# Patient Record
Sex: MALE | Race: WHITE | URBAN - METROPOLITAN AREA
[De-identification: names, ages, dates, MRNs, and addresses within clinical notes are randomized per-mention and may not be internally consistent; named-entity substitution may affect disease eponyms.]

---

## 2023-04-06 ENCOUNTER — APPOINTMENT (OUTPATIENT)
Dept: RADIOLOGY | Facility: OTHER | Age: 23
End: 2023-04-06

## 2023-04-06 VITALS
SYSTOLIC BLOOD PRESSURE: 127 MMHG | HEART RATE: 72 BPM | WEIGHT: 196 LBS | HEIGHT: 71 IN | DIASTOLIC BLOOD PRESSURE: 81 MMHG | BODY MASS INDEX: 27.44 KG/M2

## 2023-04-06 DIAGNOSIS — M25.511 ACUTE PAIN OF RIGHT SHOULDER: ICD-10-CM

## 2023-04-06 DIAGNOSIS — M24.811 INTERNAL DERANGEMENT OF RIGHT SHOULDER: Primary | ICD-10-CM

## 2023-04-06 NOTE — PROGRESS NOTES
"  Assessment  Diagnoses and all orders for this visit:    Internal derangement of right shoulder    Discussion and Plan:    · Explained to the patient that his examination and mechanism of injury is worrisome for a re current tear of his labrum  He had previous labral surgery when he was in high school due to a dislocation injury  · We will order an MRI arthrogram of his right shoulder to further evaluate the labrum  · In parallel he will begin formal physical therapy/HEP  · Follow up after MRI arthrogram for discussion of results and further treatment options based on these results  Subjective:   Patient ID: Martha Quevedo is a 20 y o  male    The patient presents with a chief complaint of right shoulder pain  The pain began 2 week(s) ago and is associated with an acute injury  Patient reports that he was bench pressing on 3/27/23 and felt a \"pop\" about the shoulder with associated pain  The patient describes the pain as aching and sharp in intensity,  intermittent in timing, and localizes the pain to the  right glenohumeral joint, deltoid  The pain is worse with overhead work, overuse and raising arm over head and relieved by rest, ice, avoiding the painful activities  The pain is not associated with numbness and tingling  The pain is not associated with constitutional symptoms  The patient is awoken at night by the pain  The patient has had prior treatment in the form of a previous arthroscopic labral repair when he was in high school in [place]          The following portions of the patient's history were reviewed and updated as appropriate: allergies, current medications, past family history, past medical history, past social history, past surgical history and problem list     Objective:  /81 (BP Location: Left arm, Patient Position: Sitting, Cuff Size: Adult)   Pulse 72   Ht 5' 11\" (1 803 m) Comment: verbal  Wt 88 9 kg (196 lb)   BMI 27 34 kg/m²       Right Shoulder Exam     Range " of Motion   The patient has normal right shoulder ROM  Muscle Strength   Abduction: 5/5   External rotation: 5/5     Tests   Apprehension: positive    Other   Erythema: absent  Sensation: normal  Pulse: present    Comments:  (+) [place]'s Test            Physical Exam  Constitutional:       General: He is not in acute distress  Appearance: He is well-developed  Eyes:      Conjunctiva/sclera: Conjunctivae normal       Pupils: Pupils are equal, round, and reactive to light  Cardiovascular:      Rate and Rhythm: Normal rate and regular rhythm  Pulmonary:      Effort: Pulmonary effort is normal       Breath sounds: Normal breath sounds  Abdominal:      General: Bowel sounds are normal       Palpations: Abdomen is soft  Musculoskeletal:      Cervical back: Normal range of motion and neck supple  Skin:     General: Skin is warm and dry  Findings: No erythema or rash  Neurological:      Mental Status: He is alert and oriented to person, place, and time  Deep Tendon Reflexes: Reflexes are normal and symmetric  Psychiatric:         Behavior: Behavior normal        I have personally reviewed pertinent films in PACS and my interpretation is as follows  X Ray Right Shoulder 4/6/23: No acute osseous abnormalities or degenerative changes       Scribe Attestation    I,:  Pennie Mercado am acting as a scribe while in the presence of the attending physician :       I,:  Noelle Navarrete MD personally performed the services described in this documentation    as scribed in my presence :

## 2023-04-25 ENCOUNTER — OFFICE VISIT (OUTPATIENT)
Dept: PHYSICAL THERAPY | Facility: OTHER | Age: 23
End: 2023-04-25

## 2023-04-25 DIAGNOSIS — M25.811 IMPINGEMENT OF RIGHT SHOULDER: Primary | ICD-10-CM

## 2023-04-25 DIAGNOSIS — R29.3 POSTURE IMBALANCE: ICD-10-CM

## 2023-04-25 NOTE — PROGRESS NOTES
"Daily Note     Today's date: 2023  Patient name: Tim Do  : 2000  MRN: [id number]  Referring provider: Barbara Medina*  Dx:   Encounter Diagnosis     ICD-10-CM    1  Impingement of right shoulder  M25 811       2  Posture imbalance  R29 3         Total treatment time: 22 [id number]; one on one 6124-5586             Subjective: Pt remarks feeling worse since previous visit and wants to make sure his home exercises are being done right  Objective: See treatment diary below    Cervical PROM:   L and R SB 50% limited     ULTT:  L: Radial (-) Median (+) Ulnar (+)   R: Radial (+) Median (-) Ulnar (-)      Assessment: Pt demonstrated HEP  Assessed cervical PROM which revealed soft tissue tightness and decreased mobility in B SB  Tested B ULTT and revealed B UE neural tension  Delivered central glides to C7 with R radial nerve glides that relieved tightness and increased cervical PROM upon retest  Added radial nerve glides for HEP  Introduced SA punches, OTB LPD/standing rows  Pt required vc on postural control and tolerated appropriately demonstrating moderate fatigue  Pt will continue to participate in skilled PT in order to improve postural control and improve cervical mobility in order to reduce symptoms and return to PLOF  Plan: Continue per plan of care        Precautions: N/A      Manuals            R Shoulder PROM AC            R GHJ A-P/Inf Gl[place] AC  Gr III            Thoracic P-A  nv           B ULTT  AC             Central Glide C7 with radial nerve glides   AC           Neuro Re-Ed             Standing Shoulder Scaption 10x2  W/ active shoulder depression            Prone Scapular Retractions 10x2x5\"            Supine pec Stretch on Foam Roll 30\"x3            SA punches  10x2x3\"           SA wall slides             Standing Rows   10x2           Prone Ts             Thoracic Extension             Prone Rows             LPD  OTB  10x2           Prone Is  10x2x5\"     " Ther Ex             pulley  5'           UBE arm bike                                                                                           Ther Activity             Pt Education Impingement syndrome  Healing expectations Referred pain (neck and shoulder )                        Gait Training                                       Modalities

## 2023-04-27 ENCOUNTER — APPOINTMENT (OUTPATIENT)
Dept: PHYSICAL THERAPY | Facility: OTHER | Age: 23
End: 2023-04-27

## 2023-05-01 ENCOUNTER — OFFICE VISIT (OUTPATIENT)
Dept: PHYSICAL THERAPY | Facility: OTHER | Age: 23
End: 2023-05-01

## 2023-05-01 DIAGNOSIS — M25.811 IMPINGEMENT OF RIGHT SHOULDER: Primary | ICD-10-CM

## 2023-05-01 DIAGNOSIS — R29.3 POSTURE IMBALANCE: ICD-10-CM

## 2023-05-01 NOTE — PROGRESS NOTES
"Daily Note     Today's date: 2023  Patient name: Patti Felipe  : 2000  MRN: [id number]  Referring provider: Becky Bentley*  Dx:   Encounter Diagnosis     ICD-10-CM    1  Impingement of right shoulder  M25 811       2  Posture imbalance  R29 3                      Subjective: \"My symptoms have definitely reduced  My neck feels a little tight today  \"      Objective: See treatment diary below      Assessment: Applied cervical glides with median nerve/radial nerve tension that improve cervical and neck mobility post delivery  Delivered thoracic P-A and GrV of T2-T3 in order to promote thoracic mobility  Introduced SA push ups and SA ball on wall  Pt tolerated with mild difficulty requiring vc on B scapular depression throughout exercises  Pt will continue to decrease UE neural tension and improve scapular strength to contribute to postural control  Added median nerve glides to HEP  Plan: Continue per plan of care        Precautions: N/A      Manuals           R Shoulder PROM AC            R GHJ A-P/Inf [place] AC  Gr III                         Thoracic P-A  nv AC  Gr III-IV    T2-T3  GrV          B ULTT  AC             Central [place]ide C7 with median/radial nerve glides   AC AC          Neuro Re-Ed             Standing Shoulder Scaption 10x2  W/ active shoulder depression            Prone Scapular Retractions 10x2x5\"            Supine pec Stretch on Foam Roll 30\"x3            SA punches  10x2x3\" 10x2x5\"          SA ball rolls     30x CW/CCW  Red           Wall push ups plus   2x8          Standing Rows   OTB  10x2 BTB  10x2          Prone Ts             Thoracic Extension             Prone Rows             LPD  OTB  10x2           Prone Is  10x2x5\"           Ther Ex             pulley  5'           UBE arm bike   5'                                                                                        Ther Activity             Pt Education Impingement syndrome  Healing " expectations Referred pain (neck and shoulder ) Posture in seated/standing                       Gait Training                                       Modalities

## 2023-05-02 ENCOUNTER — OFFICE VISIT (OUTPATIENT)
Dept: PHYSICAL THERAPY | Facility: OTHER | Age: 23
End: 2023-05-02

## 2023-05-02 DIAGNOSIS — M25.811 IMPINGEMENT OF RIGHT SHOULDER: Primary | ICD-10-CM

## 2023-05-02 DIAGNOSIS — R29.3 POSTURE IMBALANCE: ICD-10-CM

## 2023-05-02 NOTE — PROGRESS NOTES
"Daily Note     Today's date: 2023  Patient name: Princess Vázquez  : 2000  MRN: [id number]  Referring provider: Evelin De Leon*  Dx:   Encounter Diagnosis     ICD-10-CM    1  Impingement of right shoulder  M25 811       2  Posture imbalance  R29 3               Total treatment time: 5326-0094; one on one 9388-1276       Subjective: \"My neck feels tight from work but no soreness  \"       Objective: See treatment diary below      Assessment: Delivered central nerve glides that reduced neck tightness  Progressed pt as tolerated by introducing BOSU push up plus, KB rot, incline bench press and IYT on pball  Pt tolerated demonstrating moderate fatigue, no vc/mc and no symptom reproduction  Nv will incorporate UE throwing activities to mimic return to PLOF per patient  Plan: Continue per plan of care        Precautions: N/A      Manuals  5/         R Shoulder PROM AC            R GHJ A-P/Inf Glide AC  Gr III                         Thoracic P-A  nv AC  Gr III-IV    T2-T3  GrV          B ULTT  AC             Prone CTJ GrV    AC B         Central Glide C7 with median/radial nerve glides   AC AC AC         Neuro Re-Ed             Standing Shoulder Scaption 10x2  W/ active shoulder depression            Prone Scapular Retractions 10x2x5\"            Supine pec Stretch on Foam Roll 30\"x3            SA punches  10x2x3\" 10x2x5\"          BOSU push ups plus    3x5         SA ball rolls     30x CW/CCW  Red           Wall push ups plus   2x8          Standing Rows   OTB  10x2 BTB  10x2          Prone Ts             Thoracic Extension             Prone Rows             LPD  OTB  10x2           On pball  I Y T    10x2x5 ea         Incline Chest press    15# 10x  30#  6x2         KB rot    7 5# 3x10         Prone Is  10x2x5\"           Ther Ex             pulley  5'           UBE arm bike   5' 5'                                                                                       Ther Activity     " Pt Education Impingement syndrome  Healing expectations Referred pain (neck and shoulder ) Posture in seated/standing                       Gait Training                                       Modalities

## 2023-05-08 ENCOUNTER — OFFICE VISIT (OUTPATIENT)
Dept: PHYSICAL THERAPY | Facility: OTHER | Age: 23
End: 2023-05-08

## 2023-05-08 DIAGNOSIS — M25.811 IMPINGEMENT OF RIGHT SHOULDER: Primary | ICD-10-CM

## 2023-05-08 DIAGNOSIS — R29.3 POSTURE IMBALANCE: ICD-10-CM

## 2023-05-08 NOTE — PROGRESS NOTES
"Daily Note     Today's date: 2023  Patient name: Michael Cardenas  : 2000  MRN: [id number]  Referring provider: Chance Rubio*  Dx:   Encounter Diagnosis     ICD-10-CM    1  Impingement of right shoulder  M25 811       2  Posture imbalance  R29 3                     total treatment time 3575-6650, 1-on- 0283-4513  Subjective: \"My shoulder feels good, I was moving a lot today and there are some twinges if I turn my shoulder weird but overall it has been feeling much better  \"      Objective: See treatment diary below      Assessment: Session continued to focus on scapular activation, rhythmic stab, and WB exercises  Tolerated well with moderate fatigue  Educated on returning back to lifting at the gym with proper form and possibly going after PT when he said he feels the most warm  Will continue to progress with higher level UE strengthening, WB, and rhythmic stabilization as tolerated  Plan: Continue per plan of care        Precautions: N/A      Manuals  5 5/ 5        R Shoulder PROM AC            R GHJ A-P/Inf Glide AC  Gr III                         Thoracic P-A  nv AC  Gr III-IV    T2-T3  GrV  KA grIII-IV T1-T12        B ULTT  AC             Prone CTJ GrV    AC B KA B        Central Glide C7 with median/radial nerve glides   AC AC AC nv        Neuro Re-Ed             Standing Shoulder Scaption 10x2  W/ active shoulder depression            Prone Scapular Retractions 10x2x5\"            Supine pec Stretch on Foam Roll 30\"x3            SA punches  10x2x3\" 10x2x5\"          BOSU push ups plus    3x5 3x5        SA ball rolls     30x CW/CCW  Red   30x CW/CCW, ABCx1  Red         Wall push ups plus   2x8          Standing Rows   OTB  10x2 BTB  10x2  nv        Prone Ts             Thoracic Extension             Prone Rows             LPD  OTB  10x2           On pball  I Y T    10x2x5 ea 10x2x5\" ea        Incline Chest press    15# 10x  30#  6x2         KB rot    7 5# 3x10 7 5# " "2x10        Serratus pushups     nv        Prone Is  10x2x5\"                        Plank wobble board     20x AP/ML        Ther Ex             pulley  5'           UBE arm bike   5' 5' 8' retro standing         Side plank      20\"x4 B        Walking plank circuit     nv        Plank tap     nv                                               Ther Activity             Pt Education Impingement syndrome  Healing expectations Referred pain (neck and shoulder ) Posture in seated/standing                       Gait Training                                       Modalities                                            "

## 2023-05-10 ENCOUNTER — OFFICE VISIT (OUTPATIENT)
Dept: PHYSICAL THERAPY | Facility: OTHER | Age: 23
End: 2023-05-10

## 2023-05-10 DIAGNOSIS — M25.811 IMPINGEMENT OF RIGHT SHOULDER: Primary | ICD-10-CM

## 2023-05-10 DIAGNOSIS — R29.3 POSTURE IMBALANCE: ICD-10-CM

## 2023-05-10 NOTE — PROGRESS NOTES
"Daily Note     Today's date: 5/10/2023  Patient name: Ministerio Martinez  : 2000  MRN: [id number]  Referring provider: Roxane Villeda*  Dx:   Encounter Diagnosis     ICD-10-CM    1  Impingement of right shoulder  M25 811       2  Posture imbalance  R29 3                      Subjective: \"I worked out at [employer], it felt pretty good while I was doing it but it hurt a little bit later  I was pushing it and seeing what I could do  \"      Objective: See treatment diary below      Assessment: Continued to progress scapular strengthening, WB, and rhythmic stab, tolerated well, pt reported more symptoms in L shoulder than R  Can add in light overhead lifting, TRX rows, and continue with WB and rhythmic stab nv as tolerated  Plan: Continue per plan of care        Precautions: N/A      Manuals  5/ 5/10       R Shoulder PROM AC            R GHJ A-P/Inf Glide AC  Gr III                         Thoracic P-A  nv AC  Gr III-IV    T2-T3  GrV  KA grIII-IV T1-T12        B ULTT  AC             Prone CTJ GrV    AC B KA B        Central Glide C7 with median/radial nerve glides   AC AC AC nv        B pec and UT STM      KA        Neuro Re-Ed             Standing Shoulder Scaption 10x2  W/ active shoulder depression            Prone Scapular Retractions 10x2x5\"            Supine pec Stretch on Foam Roll 30\"x3     1'       SA punches  10x2x3\" 10x2x5\"          BOSU push ups plus    3x5 3x5        SA ball rolls     30x CW/CCW  Red   30x CW/CCW, ABCx1  Red         Wall push ups plus   2x8          Standing Rows   OTB  10x2 BTB  10x2  nv 30# 1x10  40# 2x10       Prone Ts             Thoracic Extension             Prone Rows             LPD  OTB  10x2           On pball  I Y T    10x2x5 ea 10x2x5\" ea        Incline Chest press    15# 10x  30#  6x2         KB rot    7 5# 3x10 7 5# 2x10        Serratus pushups     nv 3x12       Prone Is  10x2x5\"                        Plank wobble board     20x AP/ML nv    " "   Body blade      20\"x3 elbow straight, shoulder 90 degrees flexion ML/AP       Ther Ex             pulley  5'           UBE arm bike   5' 5' 8' retro standing  8' retro standing        Side plank      20\"x4 B        Walking plank circuit     nv 6\" inch box, floor, BOSU 3x4       Plank tap     nv        1/2 bankrobbers       GTB 2x10 B                                 Ther Activity             Pt Education Impingement syndrome  Healing expectations Referred pain (neck and shoulder ) Posture in seated/standing                       Gait Training                                       Modalities                                            "

## 2023-05-12 NOTE — TELEPHONE ENCOUNTER
I tried to call patient but phone number listed is not going through his mri with [facility] is not approved due to patient having to go to freestanding site per [place] guidelines  Case is currently pending per Thelda Manner with [facility]ology and [facility] but cannot get a hold of patient to let him know

## 2023-05-15 ENCOUNTER — OFFICE VISIT (OUTPATIENT)
Dept: PHYSICAL THERAPY | Facility: OTHER | Age: 23
End: 2023-05-15

## 2023-05-15 DIAGNOSIS — R29.3 POSTURE IMBALANCE: ICD-10-CM

## 2023-05-15 DIAGNOSIS — M25.811 IMPINGEMENT OF RIGHT SHOULDER: Primary | ICD-10-CM

## 2023-05-15 NOTE — PROGRESS NOTES
"Daily Note     Today's date: 5/15/2023  Patient name: Pro Browning  : 2000  MRN: [id number]  Referring provider: Ky Gaston  Dx:   Encounter Diagnosis     ICD-10-CM    1  Impingement of right shoulder  M25 811       2  Posture imbalance  R29 3                      Subjective: Patient reports b/l UT tightness today and \"a little\" painful with certain motions  Objective: See treatment diary below      Assessment: Continued to progress endurance and stabilization as charted  Superset multiple movements to challenge patient  Added light OH press, TRX rows with appropriate fatigue and minimal pain  Plan: Continue per plan of care          Precautions: N/A      Manuals / 5 5/10 5/15      R Shoulder PROM          R GHJ A-P/Inf Glide                    Thoracic P-A  KA grIII-IV T1-T12        B ULTT          Prone CTJ GrV AC B KA B        Central Glide C7 with median/radial nerve glides  AC nv        B pec and UT STM   KA        Neuro Re-Ed          Standing Shoulder Scaption          Prone Scapular Retractions          Supine pec Stretch on Foam Roll   1' Foam roll vinnie 1' ea      SA punches          BOSU push ups plus 3x5 3x5        SA ball rolls    30x CW/CCW, ABCx1  Red         Wall push ups plus          Standing Rows   nv 30# 1x10  40# 2x10       Prone Ts          Thoracic Extension          TRX Rows    3 x 10      LPD          On pball  I Y T 10x2x5 ea 10x2x5\" ea        Incline Chest press 15# 10x  30#  6x2         KB rot 7 5# 3x10 7 5# 2x10  7 5# 2 x 10    Superset arnold press 2 x 10      Serratus pushups  nv 3x12       Prone Is          Wall slides with serratus press    5\" 2 x 10 GTB      Plank wobble board  20x AP/ML nv       Body blade   20\"x3 elbow straight, shoulder 90 degrees flexion ML/AP       Ther Ex          pulley          UBE arm bike 5' 8' retro standing  8' retro standing  2'/2'      Side plank   20\"x4 B        Walking plank circuit  nv 6\" inch box, floor, " BOSU 3x4       Plank tap  nv  3 x 10      1/2 bankrobbers    GTB 2x10 B                           Ther Activity          Pt Education          Bear Crawl     15lb KB 3 laps      Gait Training                              Modalities

## 2023-05-16 ENCOUNTER — HOSPITAL ENCOUNTER (OUTPATIENT)
Dept: RADIOLOGY | Facility: HOSPITAL | Age: 23
Discharge: HOME | End: 2023-05-16
Attending: ORTHOPAEDIC SURGERY

## 2023-05-17 NOTE — TELEPHONE ENCOUNTER
I have tried to get in contact with patient to inform him that his insurance will not approve [facility] facility for mri and he needs to go to a freestanding mri location due to his insurance  I will try again to get a hold of him

## 2023-05-18 ENCOUNTER — OFFICE VISIT (OUTPATIENT)
Dept: PHYSICAL THERAPY | Facility: OTHER | Age: 23
End: 2023-05-18

## 2023-05-18 DIAGNOSIS — R29.3 POSTURE IMBALANCE: ICD-10-CM

## 2023-05-18 DIAGNOSIS — M25.811 IMPINGEMENT OF RIGHT SHOULDER: Primary | ICD-10-CM

## 2023-05-18 NOTE — PROGRESS NOTES
"Daily Note     Today's date: 2023  Patient name: Joe Curiel  : 2000  MRN: [id number]  Referring provider: James Gibson*  Dx:   Encounter Diagnosis     ICD-10-CM    1  Impingement of right shoulder  M25 811       2  Posture imbalance  R29 3                      Subjective: Patient reports he is sore from PT and gym sessions this week  Complaining of mild neck soreness which feels muscular in nature   Objective: See treatment diary below      Assessment: Continued to progress endurance and stabilization as charted  Secondary to soreness in shoulder and neck, did not progress patient today  Added IASTM to R UT and paraspinals  Had PT [facility] assess cervical restrictions which he performed various mobilizations for  Decreased pain and tension post treatment session  Plan: Continue per plan of care          Precautions: N/A      Manuals 5/2 5/8 5/10 5/15 5/18     R Shoulder PROM          R GHJ A-P/Inf [place]          CTJ     GRC     Thoracic P-A  KA grIII-IV T1-T12   GRC     SOR with rotation to the right     [facility]     Multifidus isometric                    Prone CTJ  AC B KA B   GRC     Central [place] C7 with median/radial nerve glides  AC nv        B pec and UT STM   KA        Neuro Re-Ed          Standing Shoulder Scaption          Prone Scapular Retractions          Supine pec Stretch on Foam Roll   1' Foam roll vinnie 1' ea Foam roll vinnie 1' ea     SA punches          BOSU push ups plus 3x5 3x5        SA ball rolls    30x CW/CCW, ABCx1  Red         Wall push ups plus          Standing Rows   nv 30# 1x10  40# 2x10       Prone Ts          Thoracic Extension          TRX Rows    3 x 10 3x10     LPD          On pball  I Y T 10x2x5 ea 10x2x5\" ea        Incline Chest press 15# 10x  30#  6x2         KB rot 7 5# 3x10 7 5# 2x10  7 5# 2 x 10    Superset arnold press 2 x 10 7 5# 2 x 10    Superset arnold press 2 x 10     Serratus pushups  nv 3x12       Prone Is          Wall slides with " "serratus press    5\" 2 x 10 GTB 5\" 2x10 GTB     Plank wobble board  20x AP/ML nv       Body blade   20\"x3 elbow straight, shoulder 90 degrees flexion ML/AP       Ther Ex          pulley          UBE arm bike 5' 8' retro standing  8' retro standing  2'/2' 2'/2'     Side plank   20\"x4 B        Walking plank circuit  nv 6\" inch box, floor, BOSU 3x4       Plank tap  nv  3 x 10 3x10     1/2 bankrobbers    GTB 2x10 B                           Ther Activity          Pt Education          Bear Crawl     15lb KB 3 laps 15lb KB 3 laps     Gait Training                              Modalities                                   "

## 2023-05-23 ENCOUNTER — APPOINTMENT (OUTPATIENT)
Dept: PHYSICAL THERAPY | Facility: OTHER | Age: 23
End: 2023-05-23
Payer: COMMERCIAL

## 2023-05-25 ENCOUNTER — APPOINTMENT (OUTPATIENT)
Dept: PHYSICAL THERAPY | Facility: OTHER | Age: 23
End: 2023-05-25
Payer: COMMERCIAL

## 2023-06-01 ENCOUNTER — APPOINTMENT (OUTPATIENT)
Dept: PHYSICAL THERAPY | Facility: OTHER | Age: 23
End: 2023-06-01
Payer: COMMERCIAL

## 2023-06-06 ENCOUNTER — OFFICE VISIT (OUTPATIENT)
Dept: PHYSICAL THERAPY | Facility: OTHER | Age: 23
End: 2023-06-06
Payer: COMMERCIAL

## 2023-06-06 DIAGNOSIS — M25.811 IMPINGEMENT OF RIGHT SHOULDER: Primary | ICD-10-CM

## 2023-06-06 DIAGNOSIS — R29.3 POSTURE IMBALANCE: ICD-10-CM

## 2023-06-06 PROCEDURE — 97112 NEUROMUSCULAR REEDUCATION: CPT

## 2023-06-06 PROCEDURE — 97110 THERAPEUTIC EXERCISES: CPT

## 2023-06-06 PROCEDURE — 97140 MANUAL THERAPY 1/> REGIONS: CPT

## 2023-06-06 NOTE — PROGRESS NOTES
"Daily Note     Today's date: 2023  Patient name: Davi Hernandez  : 2000  MRN: [id number]  Referring provider: Nel Graves*  Dx:   Encounter Diagnosis     ICD-10-CM    1  Impingement of right shoulder  M25 811       2  Posture imbalance  R29 3                      Subjective: \"My shoulder is good and bad, sometimes it hurts but sometimes it feels really good  It is kind of sporadic  \"      Objective: See treatment diary below      Assessment: Session focused on cervical and periscapular STM as well as cervical and thoracic mobility  Noted cervical restriction in R rot, will continue to address nv  Tolerated well, will resume strengthening nv with WB, rhythmic stab, and OH strengthening as tolerated  Plan: Continue per plan of care        Precautions: N/A      Manuals 5/2 5/8 5/10 5/15 5/18 6    R Shoulder PROM          R GHJ A-P/Inf [place]          CTJ     GRC     Thoracic P-A  KA grIII-IV T1-T12   GRC KA grIII-IV T1-T12    SOR with rotation to the right     [facility]     Multifidus isometric                    Prone CTJ  AC B KA B   GRC     Central [place] C7 with median/radial nerve glides  AC nv        B pec and UT STM   KA    KA    Cervical and periscapular STM      KA    Cervical side glides       KA general grIII-IV    Neuro Re-Ed          Standing Shoulder Scaption          Prone Scapular Retractions          Supine pec Stretch on Foam Roll   1' Foam roll vinnie 1' ea Foam roll vinnie 1' ea Foam roll protocol 1' ea     SA punches          BOSU push ups plus 3x5 3x5        SA ball rolls    30x CW/CCW, ABCx1  Red         Wall push ups plus          Standing Rows   nv 30# 1x10  40# 2x10       Prone Ts          Thoracic Extension          TRX Rows    3 x 10 3x10     LPD          On pball  I Y T 10x2x5 ea 10x2x5\" ea        Incline Chest press 15# 10x  30#  6x2         KB rot 7 5# 3x10 7 5# 2x10  7 5# 2 x 10    Superset arnold press 2 x 10 7 5# 2 x 10    Superset arnold press 2 x 10   " "  Serratus pushups  nv 3x12       Prone Is          Wall slides with serratus press    5\" 2 x 10 GTB 5\" 2x10 GTB     Plank wobble board  20x AP/ML nv       Body blade   20\"x3 elbow straight, shoulder 90 degrees flexion ML/AP       Cervical SNAG      10\"x10 R     Chin retractions supine      5\"x20    Chin tuck and lift 5\"x10x2    Ther Ex          pulley          UBE arm bike 5' 8' retro standing  8' retro standing  2'/2' 2'/2' 6' retro standing    Side plank   20\"x4 B        Walking plank circuit  nv 6\" inch box, floor, BOSU 3x4       Plank tap  nv  3 x 10 3x10     1/2 bankrobbers    GTB 2x10 B                           Ther Activity          Pt Education          Bear Crawl     15lb KB 3 laps 15lb KB 3 laps     Gait Training                              Modalities                                   "

## 2023-06-13 ENCOUNTER — OFFICE VISIT (OUTPATIENT)
Dept: PHYSICAL THERAPY | Facility: OTHER | Age: 23
End: 2023-06-13
Payer: COMMERCIAL

## 2023-06-13 DIAGNOSIS — M25.811 IMPINGEMENT OF RIGHT SHOULDER: Primary | ICD-10-CM

## 2023-06-13 DIAGNOSIS — R29.3 POSTURE IMBALANCE: ICD-10-CM

## 2023-06-13 PROCEDURE — 97110 THERAPEUTIC EXERCISES: CPT

## 2023-06-13 PROCEDURE — 97112 NEUROMUSCULAR REEDUCATION: CPT

## 2023-06-13 PROCEDURE — 97140 MANUAL THERAPY 1/> REGIONS: CPT

## 2023-06-13 NOTE — PROGRESS NOTES
"Daily Note     Today's date: 2023  Patient name: Alma Rosa Reynoso  : 2000  MRN: [id number]  Referring provider: Anabel Ceron*  Dx:   Encounter Diagnosis     ICD-10-CM    1  Impingement of right shoulder  M25 811       2  Posture imbalance  R29 3                    Total treatment time 2635-0088, 1-on-1 8119-5704  Subjective: \"My neck felt much better after last time but I feel like every morning my neck hangs a bit forward  \"      Objective: See treatment diary below      Assessment: Session focused on cervical and periscapular STM as well as cervical and thoracic mobility  Will continue to address cervical and thoracic restrictions nv and incorporate scapular and OH strengthening as tolerated  Plan: Continue per plan of care        Precautions: N/A      Manuals 5/2 5/8 5/10 5/15 5/18 6/6 6/13   R Shoulder PROM          R GHJ A-P/Inf [place]          CTJ     GRC     Thoracic P-A  KA grIII-IV T1-T12   GRC KA grIII-IV T1-T12 KA grIII-IV T1-T12   Lumbar roll grV       [facility]   SOR with rotation to the right     [place]     Multifidus isometric                    Prone CTJ  AC B KA B   GRC     Central [place] C7 with median/radial nerve glides  AC nv        B pec and UT STM   KA    KA    Cervical and periscapular STM      KA KA   Cervical side glides       KA general grIII-IV KA general grIII-IV   Neuro Re-Ed          Standing Shoulder Scaption          Prone Scapular Retractions          Supine pec Stretch on Foam Roll   1' Foam roll vinnie 1' ea Foam roll vinnie 1' ea Foam roll protocol 1' ea  Foam roll protocol 1' ea    SA punches          BOSU push ups plus 3x5 3x5        SA ball rolls    30x CW/CCW, ABCx1  Red         Wall push ups plus          Standing Rows   nv 30# 1x10  40# 2x10       Prone Ts          Thoracic Extension          TRX Rows    3 x 10 3x10  nv   LPD          On pball  I Y T 10x2x5 ea 10x2x5\" ea        Incline Chest press 15# 10x  30#  6x2         KB rot 7 5# 3x10 7 5# 2x10 " " 7 5# 2 x 10    Superset arnold press 2 x 10 7 5# 2 x 10    Superset arnold press 2 x 10     [place] carry       [employer]  nv 3x12       Prone Is          Wall slides with serratus press    5\" 2 x 10 GTB 5\" 2x10 GTB     Plank wobble board  20x AP/ML nv       Body blade   20\"x3 elbow straight, shoulder 90 degrees flexion ML/AP       Cervical SNAG      10\"x10 R     Chin retractions supine      5\"x20    Chin tuck and lift 5\"x10x2 Chin tuck and lift 5\"x10x2   Robots        2x10   Ther Ex          pulley          UBE arm bike 5' 8' retro standing  8' retro standing  2'/2' 2'/2' 6' retro standing 8' retro standing    Side plank   20\"x4 B        Walking plank circuit  nv 6\" inch box, floor, BOSU 3x4       Plank tap  nv  3 x 10 3x10     1/2 bankrobbers    GTB 2x10 B    nv   1/2 kneel with OH press        5# 10x  7 5# 10x             Ther Activity          Pt Education          Bear Crawl     15lb KB 3 laps 15lb KB 3 laps     Gait Training                              Modalities                                   "

## 2023-06-15 ENCOUNTER — OFFICE VISIT (OUTPATIENT)
Dept: PHYSICAL THERAPY | Facility: OTHER | Age: 23
End: 2023-06-15
Payer: COMMERCIAL

## 2023-06-15 DIAGNOSIS — M25.811 IMPINGEMENT OF RIGHT SHOULDER: Primary | ICD-10-CM

## 2023-06-15 DIAGNOSIS — R29.3 POSTURE IMBALANCE: ICD-10-CM

## 2023-06-15 PROCEDURE — 97112 NEUROMUSCULAR REEDUCATION: CPT

## 2023-06-15 PROCEDURE — 97140 MANUAL THERAPY 1/> REGIONS: CPT

## 2023-06-15 PROCEDURE — 97110 THERAPEUTIC EXERCISES: CPT

## 2023-06-15 NOTE — PROGRESS NOTES
"Daily Note     Today's date: 6/15/2023  Patient name: Narcisa Ferris  : 2000  MRN: [id number]  Referring provider: Addis Holt*  Dx:   Encounter Diagnosis     ICD-10-CM    1  Impingement of right shoulder  M25 811       2  Posture imbalance  R29 3                    Total treatment time 5223-5019, 1-on-1 1051-6741  Subjective: \"My shoulder feels okay, I am still sore from Monday  I want to do longer sessions because I really want to get this fixed  \"      Objective: See treatment diary below      Assessment: Pt tolerated treatment well, will continue to progress DNF motor control, thoracic mobility, and scapular control with OH movements and rhythmic stab  Pt requested working on more throwing movements and continue with WB as well  Plan: Continue per plan of care        Precautions: N/A      Manuals /2 5/8 5/10 5/15 5/18 6/6 6/13 6/15   R Shoulder PROM           R GHJ A-P/Inf [place]           CTJ     GRC      Thoracic P-A  KA grIII-IV T1-T12   GRC KA grIII-IV T1-T12 KA grIII-IV T1-T12 KA grIII-IV T1-T12   Lumbar roll grV       KA and [place]    SOR with rotation to the right     [place]      Multifidus isometric                      Prone CTJ  AC B KA B   GRC      Central [place] C7 with median/radial nerve glides  AC nv         B pec and UT STM   KA    KA  KA   Cervical and periscapular STM      KA KA KA   Cervical side glides       KA general grIII-IV KA general grIII-IV KA general grIII-IV   Neuro Re-Ed           Standing Shoulder Scaption           Prone Scapular Retractions           Supine pec Stretch on Foam Roll   1' Foam roll vinnie 1' ea Foam roll vinnie 1' ea Foam roll protocol 1' ea  Foam roll protocol 1' ea     SA punches           BOSU push ups plus 3x5 3x5         SA ball rolls    30x CW/CCW, ABCx1  Red          Wall push ups plus           Standing Rows   nv 30# 1x10  40# 2x10        Prone Ts           Thoracic Extension           TRX Rows    3 x 10 3x10  nv nv   LPD           On " "pball  I Y T 10x2x5 ea 10x2x5\" ea         Incline Chest press 15# 10x  30#  6x2          KB rot 7 5# 3x10 7 5# 2x10  7 5# 2 x 10    Superset arnold press 2 x 10 7 5# 2 x 10    Superset arnold press 2 x 10      [place] carry       [employer] pushups  nv 3x12        Prone Is           Wall slides with serratus press    5\" 2 x 10 GTB 5\" 2x10 GTB      Plank wobble board  20x AP/ML nv        Body blade   20\"x3 elbow straight, shoulder 90 degrees flexion ML/AP        Cervical SNAG      10\"x10 R      Chin retractions supine      5\"x20    Chin tuck and lift 5\"x10x2 Chin tuck and lift 5\"x10x2 Chin tuck off EOT 20x    Robots        2x10    Cat cow        10x    rebounder         Red 20x in ER   Ther Ex           pulley           UBE arm bike 5' 8' retro standing  8' retro standing  2'/2' 2'/2' 6' retro standing 8' retro standing  8' retro standing    Side plank   20\"x4 B         Walking plank circuit  nv 6\" inch box, floor, BOSU 3x4        Plank tap  nv  3 x 10 3x10      full bankrobbers    GTB 2x10 B     GTB 2x10   1/2 kneel with OH press        5# 10x  7 5# 10x    Standing B shoulder horizontal abd/add        BTB 2x10 with chin tuck    Ther Activity           Pt Education           Bear Crawl     15lb KB 3 laps 15lb KB 3 laps      Gait Training                                 Modalities                                      "

## 2023-06-20 ENCOUNTER — OFFICE VISIT (OUTPATIENT)
Dept: PHYSICAL THERAPY | Facility: OTHER | Age: 23
End: 2023-06-20
Payer: COMMERCIAL

## 2023-06-20 DIAGNOSIS — M25.811 IMPINGEMENT OF RIGHT SHOULDER: Primary | ICD-10-CM

## 2023-06-20 DIAGNOSIS — R29.3 POSTURE IMBALANCE: ICD-10-CM

## 2023-06-20 PROCEDURE — 97140 MANUAL THERAPY 1/> REGIONS: CPT

## 2023-06-20 PROCEDURE — 97110 THERAPEUTIC EXERCISES: CPT

## 2023-06-20 PROCEDURE — 97112 NEUROMUSCULAR REEDUCATION: CPT

## 2023-06-20 PROCEDURE — 97530 THERAPEUTIC ACTIVITIES: CPT

## 2023-06-20 NOTE — PROGRESS NOTES
"Daily Note     Today's date: 2023  Patient name: Wesly Fraser  : 2000  MRN: [id number]  Referring provider: Hui Ashraf*  Dx:   Encounter Diagnosis     ICD-10-CM    1  Impingement of right shoulder  M25 811       2  Posture imbalance  R29 3                    Total treatment time 7625-5188, 1-on-[id number]  Subjective: \"My shoulder is okay, my neck has just been hurting so much lately  \"      Objective: See treatment diary below      Assessment: Trialed cupping today for cervical / periscapular musculature - tolerated well with symptoms of tightness  Will reassess response nv  Plan: Continue per plan of care        Precautions: N/A      Manuals 6/6 6/13 6/15 6/20   R Shoulder PROM       R GHJ A-P/Inf [place]       CTJ    KA seated grV    Thoracic P-A KA grIII-IV T1-T12 KA grIII-IV T1-T12 KA grIII-IV T1-T12    Lumbar roll grV  KA and [facility]     SOR with rotation to the right       Multifidus isometric              Prone CTJ        Central [place] C7 with median/radial nerve glides        B pec and UT STM KA  KA KA   Cervical and periscapular STM KA KA KA KA   Cervical side glides  KA general grIII-IV KA general grIII-IV KA general grIII-IV KA general grIII-IV   Cupping B rhomboids / UT / LS    KA     Static 2'    AROM cervical rot 10x, SB 10x, scapular protraction/retraction 10x ea    Neuro Re-Ed       Standing Shoulder Scaption       Prone Scapular Retractions       Supine pec Stretch on Foam Roll Foam roll protocol 1' ea  Foam roll protocol 1' ea   nv   SA punches       BOSU push ups plus       SA ball rolls         Wall push ups plus       Standing Rows        Prone Ts       Thoracic Extension       TRX Rows  nv nv 3x8   LPD       On pball  I Y T       Incline Chest press       KB rot       [place] carry  nv nv nv   Serratus pushups       Prone Is       Wall slides with serratus press       Plank wobble board    20x ea ML/AP   Body blade       Cervical SNAG 10\"x10 R       Chin " "retractions supine 5\"x20    Chin tuck and lift 5\"x10x2 Chin tuck and lift 5\"x10x2 Chin tuck off EOT 20x     Robots   2x10     Cat cow   10x     rebounder    Red 20x in ER    Ther Ex       pulley       UBE arm bike 6' retro standing 8' retro standing  8' retro standing  8' retro standing    Side plank        Walking plank circuit       Plank tap       full bankrobbers    GTB 2x10    1/2 kneel with OH press   5# 10x  7 5# 10x  7 5# 3x8 B    Standing B shoulder horizontal abd/add   BTB 2x10 with chin tuck     Stir the pot    20x CW/CCW   Ther Activity       Pt Education       Bear Crawl        Standing ER ball taps    Tennis ball 20x3    Gait Training                     Modalities                          "

## 2023-06-22 ENCOUNTER — OFFICE VISIT (OUTPATIENT)
Dept: PHYSICAL THERAPY | Facility: OTHER | Age: 23
End: 2023-06-22
Payer: COMMERCIAL

## 2023-06-22 DIAGNOSIS — R29.3 POSTURE IMBALANCE: ICD-10-CM

## 2023-06-22 DIAGNOSIS — M25.811 IMPINGEMENT OF RIGHT SHOULDER: Primary | ICD-10-CM

## 2023-06-22 PROCEDURE — 97140 MANUAL THERAPY 1/> REGIONS: CPT

## 2023-06-22 PROCEDURE — 97110 THERAPEUTIC EXERCISES: CPT

## 2023-06-22 PROCEDURE — 97112 NEUROMUSCULAR REEDUCATION: CPT

## 2023-06-22 NOTE — PROGRESS NOTES
"Daily Note     Today's date: 2023  Patient name: Karin Saldana  : 2000  MRN: [id number]  Referring provider: Oneida Brown*  Dx:   Encounter Diagnosis     ICD-10-CM    1  Impingement of right shoulder  M25 811       2  Posture imbalance  R29 3                    Total treatment time 1837-3069, 4697-5706  Subjective: \"My shoulder was a little sore after last time  My low back really hurts today  \"      Objective: See treatment diary below      Assessment: Low back relief with STM and PA today  Continued to focus on core activation, scapular strengthening, and CKC activities  Pt tolerated well with some symptoms  Pt states he has noted changes in symptoms and is happy with his progress, however, he is still unable to bench and do other activities with his UE due to pain  Will continue to progress as tolerated  Plan: Continue per plan of care        Precautions: N/A      Manuals 6/6 6/13 6/15 6/20 6/22   R Shoulder PROM        R GHJ A-P/Inf [place]        CTJ    KA seated grV     Thoracic P-A KA grIII-IV T1-T12 KA grIII-IV T1-T12 KA grIII-IV T1-T12     Lumbar roll grV  KA and [facility]      SOR with rotation to the right        Multifidus isometric                Prone CTJ         Central [place] C7 with median/radial nerve glides         B pec and UT STM KA  KA KA    Cervical and periscapular STM KA KA KA KA    Cervical side glides  KA general grIII-IV KA general grIII-IV KA general grIII-IV KA general grIII-IV    Cupping B rhomboids / UT / LS    KA     Static 2'    AROM cervical rot 10x, SB 10x, scapular protraction/retraction 10x ea     Lumbar paraspinals STM     KA   Lumbar P-A     KA L1-L5 grIII-IV   Neuro Re-Ed        Standing Shoulder Scaption        Prone Scapular Retractions        Supine pec Stretch on Foam Roll Foam roll protocol 1' ea  Foam roll protocol 1' ea   nv    SA punches        BOSU push ups plus        SA ball rolls          Wall push ups plus        Standing Rows       " "  Prone Ts        Thoracic Extension        TRX Rows  nv nv 3x8    LPD        On pball  I Y T        Incline Chest press        KB rot        [employer] carry  nv nv nv 40# B 1'x2    KB reverse 15# 30\"x2   Serratus pushups        Prone Is        Wall slides with serratus press        Plank wobble board    20x ea ML/AP    Body blade        Cervical SNAG 10\"x10 R        Chin retractions supine 5\"x20    Chin tuck and lift 5\"x10x2 Chin tuck and lift 5\"x10x2 Chin tuck off EOT 20x      Robots   2x10      Cat cow   10x      rebounder    Red 20x in ER     Dead bug      5\"x2x5 B    Ther Ex        pulley        UBE arm bike 6' retro standing 8' retro standing  8' retro standing  8' retro standing  8' retro standing    Side plank         Walking plank circuit        Plank tap        full bankrobbers    GTB 2x10     1/2 kneel with OH press   5# 10x  7 5# 10x  7 5# 3x8 B     Standing B shoulder horizontal abd/add   BTB 2x10 with chin tuck      Stir the pot    20x CW/CCW    PBall rollout     10\"x5 3 way   TrX plank     3x6   TRX rows     3x10   BOSU ball toss     6x   Ther Activity        Pt Education        Bear Crawl         Standing ER ball taps    Tennis ball 20x3     Gait Training                        Modalities                             "

## 2023-06-26 ENCOUNTER — HOSPITAL ENCOUNTER (OUTPATIENT)
Dept: RADIOLOGY | Facility: HOSPITAL | Age: 23
Discharge: HOME | End: 2023-06-26
Attending: ORTHOPAEDIC SURGERY

## 2023-06-26 ENCOUNTER — OFFICE VISIT (OUTPATIENT)
Dept: PHYSICAL THERAPY | Facility: OTHER | Age: 23
End: 2023-06-26
Payer: COMMERCIAL

## 2023-06-26 DIAGNOSIS — R29.3 POSTURE IMBALANCE: ICD-10-CM

## 2023-06-26 DIAGNOSIS — M25.811 IMPINGEMENT OF RIGHT SHOULDER: Primary | ICD-10-CM

## 2023-06-26 PROCEDURE — 97112 NEUROMUSCULAR REEDUCATION: CPT

## 2023-06-26 PROCEDURE — 97140 MANUAL THERAPY 1/> REGIONS: CPT

## 2023-06-26 PROCEDURE — 97110 THERAPEUTIC EXERCISES: CPT

## 2023-06-26 NOTE — PROGRESS NOTES
"Daily Note     Today's date: 2023  Patient name: Mana Bright  : 2000  MRN: [id number]  Referring provider: Azul Loco*  Dx:   Encounter Diagnosis     ICD-10-CM    1  Impingement of right shoulder  M25 811       2  Posture imbalance  R29 3                      Subjective: \"I felt pretty good after last time, I had just a little bit of pain but I went to the gym and was able to bench and focus on my form  \"      Objective: See treatment diary below      Assessment: Cupping and FR protocol aided in alleviating patient's feelings of tightness  Session continued to focus on scapular and RTC strengthening with rhythmic stabilization  Tolerated well with minimal to no symptoms throughout session  Will continue to progress WB, scapular and RTC strengthening, and rhythmic stabilization as tolerated  Plan: Continue per plan of care        Precautions: N/A      Manuals 6/6 6/13 6/15 6/20 6/22 6/26   R Shoulder PROM         R GHJ A-P/Inf [place]         CTJ    KA seated grV      Thoracic P-A KA grIII-IV T1-T12 KA grIII-IV T1-T12 KA grIII-IV T1-T12      Lumbar roll grV  KA and [facility]       SOR with rotation to the right         Multifidus isometric                  Prone CTJ          Central [place] C7 with median/radial nerve glides          B pec and UT STM KA  KA KA     Cervical and periscapular STM KA KA KA KA     Cervical side glides  KA general grIII-IV KA general grIII-IV KA general grIII-IV KA general grIII-IV     Cupping B rhomboids / UT / LS    KA     Static 2'    AROM cervical rot 10x, SB 10x, scapular protraction/retraction 10x ea   KA     Static 3'    AROM cervical rot 10x, SB 10x, scapular protraction/retraction 20x ea    Lumbar paraspinals STM     KA    Lumbar P-A     KA L1-L5 grIII-IV    Neuro Re-Ed         Standing Shoulder Scaption         Prone Scapular Retractions         Supine pec Stretch on Foam Roll Foam roll protocol 1' ea  Foam roll protocol 1' ea   nv  Foam roll " "protocol 1' ea    SA punches         BOSU push ups plus         SA ball rolls           Wall push ups plus         Standing Rows          Prone Ts         Thoracic Extension         TRX Rows  nv nv 3x8     LPD         On pball  I Y T         Incline Chest press         KB rot         [place] carry  nv nv nv 40# B 1'x2    KB reverse 15# 30\"x2    Serratus pushups         Prone Is         Wall slides with serratus press         Plank wobble board    20x ea ML/AP     Body blade      90 degrees shoulder flexion ML/AP, 180 degrees flexion A/P, 20\"x3 ea    Cervical SNAG 10\"x10 R         Chin retractions supine 5\"x20    Chin tuck and lift 5\"x10x2 Chin tuck and lift 5\"x10x2 Chin tuck off EOT 20x       Robots   2x10       Cat cow   10x       rebounder    Red 20x in ER   nv   Dead bug      5\"x2x5 B     Ther Ex         pulley         UBE arm bike 6' retro standing 8' retro standing  8' retro standing  8' retro standing  8' retro standing  8' retro standing    Side plank          Walking plank circuit      BOSU and 6\" pushup 1 ea, 3 sets of 4    Plank tap         full bankrobbers    GTB 2x10   nv   1/2 kneel with OH press   5# 10x  7 5# 10x  7 5# 3x8 B   10# dumbbell vertical 3x8 B    Standing B shoulder horizontal abd/add   BTB 2x10 with chin tuck    BTB 2x10 with chin tuck   Bench TB       BTB 2x10   Stir the pot    20x CW/CCW     PBall rollout     10\"x5 3 way    TrX plank     3x6    TRX rows     3x10    BOSU ball toss     6x    Ther Activity         Pt Education         Bear Crawl          Standing ER ball taps    Tennis ball 20x3      Gait Training                           Modalities                                "

## 2023-06-29 ENCOUNTER — OFFICE VISIT (OUTPATIENT)
Dept: PHYSICAL THERAPY | Facility: OTHER | Age: 23
End: 2023-06-29
Payer: COMMERCIAL

## 2023-06-29 DIAGNOSIS — R29.3 POSTURE IMBALANCE: ICD-10-CM

## 2023-06-29 DIAGNOSIS — M25.811 IMPINGEMENT OF RIGHT SHOULDER: Primary | ICD-10-CM

## 2023-06-29 PROCEDURE — 97110 THERAPEUTIC EXERCISES: CPT | Performed by: PHYSICAL THERAPIST

## 2023-06-29 PROCEDURE — 97112 NEUROMUSCULAR REEDUCATION: CPT | Performed by: PHYSICAL THERAPIST

## 2023-06-29 PROCEDURE — 97140 MANUAL THERAPY 1/> REGIONS: CPT | Performed by: PHYSICAL THERAPIST

## 2023-06-29 NOTE — PROGRESS NOTES
Daily Note     Today's date: 2023  Patient name: Tia Noyola  : 2000  MRN: [id number]  Referring provider: May Gill*  Dx:   Encounter Diagnosis     ICD-10-CM    1  Impingement of right shoulder  M25 811       2  Posture imbalance  R29 3                      Subjective: Pt reports that his neck is a little stiff and a little bit of pain in the shoulder  Objective: See treatment diary below      Assessment: Tolerated treatment well  Patient demonstrated fatigue post treatment, exhibited good technique with therapeutic exercises and would benefit from continued PT  Pt demonstrated a favorable response to cupping with decreased pain post cupping  Plan: Continue per plan of care  Progress treatment as tolerated         Precautions: N/A      Manuals 6/29  6/15 6/20 6/22 6/26   R Shoulder PROM         R GHJ A-P/Inf [place]         CTJ    KA seated grV      Thoracic P-A   KA grIII-IV T1-T12      Lumbar roll grV         SOR with rotation to the right         Multifidus isometric                  Prone CTJ          Central [place] C7 with median/radial nerve glides          B pec and UT STM   KA KA     Cervical and periscapular STM   KA KA     Cervical side glides    KA general grIII-IV KA general grIII-IV     Cupping B rhomboids / UT / LS KA     Static 3'    AROM cervical rot 10x, SB 10x, scapular protraction/retraction 20x ea    KA     Static 2'    AROM cervical rot 10x, SB 10x, scapular protraction/retraction 10x ea   KA     Static 3'    AROM cervical rot 10x, SB 10x, scapular protraction/retraction 20x ea    Lumbar paraspinals STM     KA    Lumbar P-A     KA L1-L5 grIII-IV    Neuro Re-Ed         Standing Shoulder Scaption         Prone Scapular Retractions         Supine pec Stretch on Foam Roll Foam roll protocol 1' ea    nv  Foam roll protocol 1' ea    SA punches         BOSU push ups plus         SA ball rolls           Wall push ups plus         Standing Rows          Prone "Ts         Thoracic Extension         TRX Rows   nv 3x8     LPD         On pball  I Y T         Incline Chest press         KB rot         [place] carry   nv nv 40# B 1'x2    KB reverse 15# 30\"x2    Serratus pushups         Prone Is         Wall slides with serratus press         Plank wobble board    20x ea ML/AP     Body blade      90 degrees shoulder flexion ML/AP, 180 degrees flexion A/P, 20\"x3 ea    Cervical SNAG         Chin retractions supine   Chin tuck off EOT 20x       Robots          Cat cow   10x       rebounder    Red 20x in ER   nv   Dead bug      5\"x2x5 B     Ther Ex         pulley         UBE arm bike for postural endurance training 8' retro standing   8' retro standing  8' retro standing  8' retro standing  8' retro standing    Side plank          Walking plank circuit BOSU and 6\" pushup 1 ea, 3 sets of 4      BOSU and 6\" pushup 1 ea, 3 sets of 4    Plank tap         full bankrobbers    GTB 2x10   nv   1/2 kneel with OH press  10# KB vertical 3x8 B on foam   7 5# 3x8 B   10# dumbbell vertical 3x8 B    Standing B shoulder horizontal abd/add BTB 2x10 with chin tuck  BTB 2x10 with chin tuck    BTB 2x10 with chin tuck   Bench TB  Standing BTB 2x10     BTB 2x10   Stir the pot    20x CW/CCW     PBall rollout     10\"x5 3 way    TrX plank     3x6    TRX rows     3x10    MB 90/90 toss against wall  To fatigue x2 rounds RMB         BOSU ball toss     6x    Ther Activity         Pt Education         Bear Crawl          Standing ER ball taps    Tennis ball 20x3      Gait Training                           Modalities                                "

## 2023-07-10 ENCOUNTER — OFFICE VISIT (OUTPATIENT)
Dept: PHYSICAL THERAPY | Facility: OTHER | Age: 23
End: 2023-07-10
Payer: COMMERCIAL

## 2023-07-10 DIAGNOSIS — R29.3 POSTURE IMBALANCE: ICD-10-CM

## 2023-07-10 DIAGNOSIS — M25.811 IMPINGEMENT OF RIGHT SHOULDER: Primary | ICD-10-CM

## 2023-07-10 PROCEDURE — 97530 THERAPEUTIC ACTIVITIES: CPT

## 2023-07-10 PROCEDURE — 97140 MANUAL THERAPY 1/> REGIONS: CPT

## 2023-07-10 PROCEDURE — 97112 NEUROMUSCULAR REEDUCATION: CPT

## 2023-07-10 PROCEDURE — 97110 THERAPEUTIC EXERCISES: CPT

## 2023-07-10 NOTE — PROGRESS NOTES
Daily Note     Today's date: 7/10/2023  Patient name: Tucker Maldonado  : 2000  MRN: [id number]  Referring provider: Marylen Niemann*  Dx:   Encounter Diagnosis     ICD-10-CM    1. Impingement of right shoulder  M25.811       2. Posture imbalance  R29.3                      Subjective: "My shoulder was okay on vacation, I had some pain carrying the kids and swimming but just small pinches here and there."      Objective: See treatment diary below      Assessment: Session continued to focus on general scapular activation with emphasis on throwing today. Tolerated well, continues to have pain in fully ER position of shoulder at 90 degrees of flexion. Will continue with CKC activities, throwing, balance, and multi-task activities for return to function. Plank abi mccarthy. Plan: Continue per plan of care.       Precautions: N/A      Manuals 6/29 6/22 6/26 7/10   R Shoulder PROM       R GHJ A-P/Inf [place]       CTJ       Thoracic P-A       Lumbar roll grV       SOR with rotation to the right       Multifidus isometric              Prone CTJ        Central [place] C7 with median/radial nerve glides        B pec and UT STM       Cervical and periscapular STM    KA   Cervical side glides        Cupping B rhomboids / UT / LS KA     Static 3'    AROM cervical rot 10x, SB 10x, scapular protraction/retraction 20x ea   KA     Static 3'    AROM cervical rot 10x, SB 10x, scapular protraction/retraction 20x ea  KA     Static 3'    AROM cervical rot 10x, SB 10x, scapular protraction/retraction 20x ea    Lumbar paraspinals STM  KA     Lumbar P-A  KA L1-L5 grIII-IV     Neuro Re-Ed       Standing Shoulder Scaption       Prone Scapular Retractions       Supine pec Stretch on Foam Roll Foam roll protocol 1' ea   Foam roll protocol 1' ea     SA punches       BOSU push ups plus       SA ball rolls         Wall push ups plus       Standing Rows        Prone Ts       Thoracic Extension       TRX Rows       LPD       On pball  I Y T       Incline Chest press       KB rot       [place] carry  40# B 1'x2    KB reverse 15# 30"x2  nv   Serratus pushups       Prone Is       Wall slides with serratus press       Plank wobble board       Body blade   90 degrees shoulder flexion ML/AP, 180 degrees flexion A/P, 20"x3 ea     Cervical SNAG       Chin retractions supine       Robots        Cat cow       rebounder    nv 20x red DL, SLS B    Dead bug   5"x2x5 B      Ther Ex       pulley       UBE arm bike for postural endurance training 8' retro standing  8' retro standing  8' retro standing  8' retro standing    Side plank        Walking plank circuit BOSU and 6" pushup 1 ea, 3 sets of 4   BOSU and 6" pushup 1 ea, 3 sets of 4     Plank tap       full bankrobbers    nv GTB 3x8   1/2 kneel with OH press  10# KB vertical 3x8 B on foam  10# dumbbell vertical 3x8 B     Standing B shoulder horizontal abd/add BTB 2x10 with chin tuck  BTB 2x10 with chin tuck    Bench TB  Standing BTB 2x10  BTB 2x10    Stir the pot       PBall rollout  10"x5 3 way     TrX plank  3x6     TRX rows  3x10     MB 90/90 toss against wall  To fatigue x2 rounds RMB       BOSU ball toss  6x     TB snow angels     GTB 3x8   Ther Activity       Pt Education       Bear Crawl        Standing ER ball taps    Green ball 20x3   Prone shoulder ER catch    nv   Kneeling ER follow through catch    Green ball 3x6   Gait Training                     Modalities

## 2023-07-12 ENCOUNTER — EVALUATION (OUTPATIENT)
Dept: PHYSICAL THERAPY | Facility: OTHER | Age: 23
End: 2023-07-12
Payer: COMMERCIAL

## 2023-07-12 DIAGNOSIS — M25.811 IMPINGEMENT OF RIGHT SHOULDER: Primary | ICD-10-CM

## 2023-07-12 DIAGNOSIS — R29.3 POSTURE IMBALANCE: ICD-10-CM

## 2023-07-12 PROCEDURE — 97140 MANUAL THERAPY 1/> REGIONS: CPT

## 2023-07-12 PROCEDURE — 97112 NEUROMUSCULAR REEDUCATION: CPT

## 2023-07-12 PROCEDURE — 97110 THERAPEUTIC EXERCISES: CPT

## 2023-07-12 NOTE — PROGRESS NOTES
PT Re-Evaluation     Today's date: 2023  Patient name: Nieves Forrester  : 2000  MRN: [id number]  Referring provider: Melvina Goodwin*  Dx:   Encounter Diagnosis     ICD-10-CM    1. Impingement of right shoulder  M25.811       2. Posture imbalance  R29.3                    Total treatment time 4414-3232, 1-on-1 [id number]  Subjective: "My shoulder was a little sore after last time but not bad."  Pain  Current pain ratin  At best pain ratin  At worst pain ratin  Location: R shoulder  (top of acromion; posterior shoulder )  Quality: sharp  Relieving factors: ice, rest, relaxation and change in position  Aggravating factors: overhead activity, throwing, bench pressing, shoulder in fully ER position in abduction    Progression: improved      Objective: See treatment diary below    Shoulder Comments   UQS:  C5 reflex: L   2+    R 2+  C6 reflex: L   2+    R 2+  C7 reflex: L   2+    R 2+  Dermatomes: L   2+    R 2+  Myotomes: L   2+    R 2+ p! with C4      Shoulder ROM:   Flexion: L  180   R 180 p! Abduction: B   070    R 180 slight p! Dyskinesis / winging: resolved    ER at side: L 90      R 90, no p!     Shoulder MMT:  Flexion: L  5 /5     R  4/5 p! Abduction: L   5/5     R   4/5 p! ER: [place] /5     R   5/5 p! IR: L   5/5     R   5/5 p!        Shoulder PROM: full ROM in all directions, slight p!  At end ranges     Scapular strength:  Upper trap: L   5/5     R  5 /5  Middle trap and low trap improved to 4/5 on R     Shoulder clinical tests: n/t today  Painful arc: L  -     R -  Drop arm: L  -     R -  Hawkin's-Bharath: L    -   R +  Infraspinatus test: L  -     R +  Glynn: L  -     R +  Neer's: L    -   R +  O'navarro's: L -      R   +(across shoulder thumb up thumb down)  Empty can: L   -    R +  Scour test: L - R -  Load and Shift: L - R -   Yergason's: L  -     R -  Bicep's load I: L   -    R +  Bicep's load II: L -      R +        Assessment:   RE 2023 details: Pt reports 50% improvement since his initial PT evaluation. His pain has significantly decreased over time but continues to have symptoms with ballistic arm movements, bench pressing, performing OH activities, pushing, and having the shoulder in an abducted and ER position. He no longer has pain when he reaches across his body. He also notes improvements in stability and strength. Objectively, pt has decreased pain with all AROM and PROM of the shoulder. He continues to have pain and weakness with RTC and scapular MMT, though improvements in strength noted. He plans to receive an MRI in the upcoming future. Pt would benefit from continued physical therapy services in order to address above impairments, restore PLOF, and improve QOL. IE 4/19/2023 details: Eliud Rivers is a 20y.o. year-old M who presents with R shoulder pain. Pt reports bench pressing on 3/27/23 when he felt a "crush and my shoulder going up and in." Since that point, pt describes lifting OH, sleeping or any rotation across the body causes a sharp pain at the top of the R shoulder. Pt works in sales and comments upon his "poor posture" that he notices throughout the work day. Additionally, pt reports feeling "unstable and loose" in the R shoulder. Pt has a history of a R shoulder dislocation with labral involvement occurring in 2016 during football in which he attended physical therapy. Pt additionally has a history of neck and spinal cord injury when he was [age] years old resulting in "torn ligaments of the neck and a significant concussion" from football as well. Pt's PLOF includes lifting 3-4 times a week mainly focusing upon upper body strengthening and participating in recreational athletics. No numbness or tingling. Pt has MRA scheduled for 5/16/23. No further referral necessary at this time.         Pt may be experiencing R shoulder impingement based on clinical testing for shoulder impingement, clinical presentation of decreased scapular strength, B elevated scapulae, decreased postural control and pain at EOR with R shoulder flexion, abduction and ER, as well as difficulty with the execution of OH lifting activities. Pt would benefit from skilled physical therapy services to address current deficits, improve quality of life, and restore PLOF. Primary Impairments:  1) decreased scapular strength   2) decreased postural control  3) B elevated scapulae    Etiologic factors include history of R shoulder dislocation and chronic overuse of OH lifting. Positive prognostic indicators include age, motivation, and support . Negative prognostic factors include history of overuse in UE physical activity and smoking. Function Based Goals:  Patient will be independent with HEP upon discharge. - met  Patient will be able to independently manage symptoms upon discharge. - met   Patient will be able to sustain proper posture throughout work day upon discharge. - partially met  Patient will be able to bench press PLOF with minimal to no symptoms upon discharge. - partially met  Patient will be able to throw baseball and football with minimal to no symptoms upon discharge. - partially met    Impairment Based Goals:  Patient will be able to improve R shoulder stability by increasing scapular strength by 1/2 MMT in 4 weeks. - met  Patient will improve mobility of the scapula through improved postural control by pt demonstration and PT observation in 5 weeks. - met  Patient will decrease pain by 2 points in VAS with R shoulder flexion, abduction, and ER in 4 weeks. - met    Impairments: abnormal muscle firing, impaired physical strength, pain with function, poor posture  and poor body mechanics    Patient Goals  Patient goals for therapy: decreased pain, increased strength and return to sport/leisure activities  Patient goal: Pt wants to return to lifting and be able to throw for athletic activities as well as improve posture.        Plan  Plan details: 2x a week for 12 weeks with HEP  Patient would benefit from: skilled physical therapy  Referral necessary: No  Planned modality interventions: low level laser therapy, cryotherapy, manual electrical stimulation, electrical stimulation/Russian stimulation, TENS, thermotherapy: hydrocollator packs, traction, high voltage pulsed current: pain management and high voltage pulsed current: spasm management  Other planned modality interventions: EPAT  Planned therapy interventions: abdominal trunk stabilization, massage, manual therapy, joint mobilization, ADL training, motor coordination training, neuromuscular re-education, body mechanics training, patient education, postural training, coordination, strengthening, stretching, fine motor coordination training, therapeutic activities, therapeutic exercise, flexibility and home exercise program  Frequency: 2x week  Duration in weeks: 12  Treatment plan discussed with: patient     Precautions: N/A      Manuals 6/29 6/22 6/26 7/10 7/12   R Shoulder PROM        R GHJ A-P/Inf [place]        CTJ        Thoracic P-A        Lumbar roll grV        SOR with rotation to the right        Multifidus isometric                Prone CTJ         Central [place] C7 with median/radial nerve glides         B pec and UT STM        Cervical and periscapular STM    KA    Cervical side glides         Cupping B rhomboids / UT / LS KA     Static 3'    AROM cervical rot 10x, SB 10x, scapular protraction/retraction 20x ea   KA     Static 3'    AROM cervical rot 10x, SB 10x, scapular protraction/retraction 20x ea  KA     Static 3'    AROM cervical rot 10x, SB 10x, scapular protraction/retraction 20x ea     Lumbar paraspinals STM  KA      Lumbar P-A  KA L1-L5 grIII-IV      PT Re-Evaluation     KA 10'   Neuro Re-Ed        Standing Shoulder Scaption        Prone Scapular Retractions        Supine pec Stretch on Foam Roll Foam roll protocol 1' ea   Foam roll protocol 1' ea      SA jackson        BOSU push ups plus SA ball rolls          Wall push ups plus        Standing Rows         Prone Ts        Thoracic Extension        TRX Rows        LPD        I Y T     2# 2x10 5"   Incline Chest press        KB rot        [place] carry  40# B 1'x2    KB reverse 15# 30"x2  nv    Serratus pushups        Prone Is        Wall slides with serratus press        Plank wobble board        Body blade   90 degrees shoulder flexion ML/AP, 180 degrees flexion A/P, 20"x3 ea   90 degrees shoulder abduction, elbow flexion 90 degrees A/P 20"x3   Cervical SNAG        Chin retractions supine        Robots         Cat cow        rebounder    nv 20x red DL, SLS B  20x red SLS B    Dead bug   5"x2x5 B       UT TD     10"x10   Ther Ex        pulley        UBE arm bike for postural endurance training 8' retro standing  8' retro standing  8' retro standing  8' retro standing  8' retro standing    Side plank         Walking plank circuit BOSU and 6" pushup 1 ea, 3 sets of 4   BOSU and 6" pushup 1 ea, 3 sets of 4      Plank tap        full bankrobbers    nv GTB 3x8 1/2 bankrobber GTB 3x10   1/2 kneel with OH press  10# KB vertical 3x8 B on foam  10# dumbbell vertical 3x8 B      Standing B shoulder horizontal abd/add BTB 2x10 with chin tuck  BTB 2x10 with chin tuck     Bench TB  Standing BTB 2x10  BTB 2x10     Stir the pot        PBall rollout  10"x5 3 way      TrX plank  3x6      TRX rows  3x10      MB 90/90 toss against wall  To fatigue x2 rounds RMB        BOSU ball toss  6x      TB snow angels     GTB 3x8    Plank row      nv   Ther Activity        Pt Education        Bear Crawl         Standing ER ball taps    Green ball 20x3 Green ball 20x3   Prone shoulder ER catch    nv nv   Kneeling ER follow through catch    Green ball 3x6 Green ball 3x6   Gait Training                        Modalities

## 2023-07-17 ENCOUNTER — OFFICE VISIT (OUTPATIENT)
Dept: PHYSICAL THERAPY | Facility: OTHER | Age: 23
End: 2023-07-17
Payer: COMMERCIAL

## 2023-07-17 DIAGNOSIS — R29.3 POSTURE IMBALANCE: ICD-10-CM

## 2023-07-17 DIAGNOSIS — M25.811 IMPINGEMENT OF RIGHT SHOULDER: Primary | ICD-10-CM

## 2023-07-17 PROCEDURE — 97140 MANUAL THERAPY 1/> REGIONS: CPT

## 2023-07-17 PROCEDURE — 97530 THERAPEUTIC ACTIVITIES: CPT

## 2023-07-17 PROCEDURE — 97112 NEUROMUSCULAR REEDUCATION: CPT

## 2023-07-17 PROCEDURE — 97110 THERAPEUTIC EXERCISES: CPT

## 2023-07-17 NOTE — PROGRESS NOTES
Daily Note     Today's date: 2023  Patient name: Madeline Tovar  : 2000  MRN: [id number]  Referring provider: Romi Davila*  Dx:   Encounter Diagnosis     ICD-10-CM    1. Impingement of right shoulder  M25.811       2. Posture imbalance  R29.3                      Subjective: Pt reports he is doing well, was able to play a round of golf this week without much issue. Reports he does get some pain/discomfort with certain positions and when lifting anything overhead. Objective: See treatment diary below      Assessment: Tolerated treatment well. Performed all exercises without issues, continue to progress to tolerance. Pt would benefit from continued focus on strengthening exercises. Patient demonstrated fatigue post treatment, exhibited good technique with therapeutic exercises and would benefit from continued PT      Plan: Continue per plan of care.       Precautions: N/A      Manuals 6/29 6/22 6/26 7/10 7/12 7/17   R Shoulder PROM         R GHJ A-P/Inf [place]         CTJ         Thoracic P-A         Lumbar roll grV         SOR with rotation to the right         Multifidus isometric                  Prone CTJ          Central [place] C7 with median/radial nerve glides          B pec and UT STM         Cervical and periscapular STM    KA     Cervical side glides          Cupping B rhomboids / UT / LS KA     Static 3'    AROM cervical rot 10x, SB 10x, scapular protraction/retraction 20x ea   KA     Static 3'    AROM cervical rot 10x, SB 10x, scapular protraction/retraction 20x ea  KA     Static 3'    AROM cervical rot 10x, SB 10x, scapular protraction/retraction 20x ea   KM    Static 3'     AROM cervical rot 10x, SB 10x, scapular protraction/retraction 20x ea   Lumbar paraspinals STM  KA       Lumbar P-A  KA L1-L5 grIII-IV       PT Re-Evaluation     KA 10'    Neuro Re-Ed         Standing Shoulder Scaption         Prone Scapular Retractions         Supine pec Stretch on Foam Roll Foam roll protocol 1' ea   Foam roll protocol 1' ea       SA punches         BOSU push ups plus         SA ball rolls           Wall push ups plus         Standing Rows          Prone Ts         Thoracic Extension         TRX Rows         LPD         I Y T     2# 2x10 5"    Incline Chest press         KB rot         [place] carry  40# B 1'x2    KB reverse 15# 30"x2  nv     Serratus pushups         Prone Is         Wall slides with serratus press         Plank wobble board         Body blade   90 degrees shoulder flexion ML/AP, 180 degrees flexion A/P, 20"x3 ea   90 degrees shoulder abduction, elbow flexion 90 degrees A/P 20"x3 90 degrees shoulder abduction, elbow flexion 90 degrees A/P 20"x3   Cervical SNAG         Chin retractions supine         Robots          Cat cow         rebounder    nv 20x red DL, SLS B  20x red SLS B  20x red SLS B   Dead bug   5"x2x5 B        UT TD     10"x10    Ther Ex         pulley         UBE arm bike for postural endurance training 8' retro standing  8' retro standing  8' retro standing  8' retro standing  8' retro standing  8' retro standing   Side plank          Walking plank circuit BOSU and 6" pushup 1 ea, 3 sets of 4   BOSU and 6" pushup 1 ea, 3 sets of 4    BOSU and 6" pushup 1 ea, 3 sets of 4     Plank tap         full bankrobbers    nv GTB 3x8 1/2 bankrobber GTB 3x10 1/2 bankrobber GTB 3x10   1/2 kneel with OH press  10# KB vertical 3x8 B on foam  10# dumbbell vertical 3x8 B       Standing B shoulder horizontal abd/add BTB 2x10 with chin tuck  BTB 2x10 with chin tuck      Bench TB  Standing BTB 2x10  BTB 2x10      Stir the pot         PBall rollout  10"x5 3 way       TrX plank  3x6       TRX rows  3x10       MB 90/90 toss against wall  To fatigue x2 rounds RMB         BOSU ball toss  6x       TB snow angels     GTB 3x8     Plank row      nv    Ther Activity         Pt Education         Bear Crawl          Standing ER ball taps    Green ball 20x3 Green ball 20x3 Green ball 20x3   Prone shoulder ER catch    nv nv nv   Kneeling ER follow through catch    Green ball 3x6 Green ball 3x6 Green ball 3x6   Gait Training                           Modalities

## 2023-07-19 ENCOUNTER — APPOINTMENT (OUTPATIENT)
Dept: PHYSICAL THERAPY | Facility: OTHER | Age: 23
End: 2023-07-19
Payer: COMMERCIAL

## 2023-07-20 ENCOUNTER — APPOINTMENT (OUTPATIENT)
Dept: PHYSICAL THERAPY | Facility: OTHER | Age: 23
End: 2023-07-20
Payer: COMMERCIAL

## 2023-07-24 ENCOUNTER — OFFICE VISIT (OUTPATIENT)
Dept: PHYSICAL THERAPY | Facility: OTHER | Age: 23
End: 2023-07-24
Payer: COMMERCIAL

## 2023-07-24 DIAGNOSIS — M25.811 IMPINGEMENT OF RIGHT SHOULDER: Primary | ICD-10-CM

## 2023-07-24 DIAGNOSIS — R29.3 POSTURE IMBALANCE: ICD-10-CM

## 2023-07-24 PROCEDURE — 97140 MANUAL THERAPY 1/> REGIONS: CPT

## 2023-07-24 PROCEDURE — 97530 THERAPEUTIC ACTIVITIES: CPT

## 2023-07-24 PROCEDURE — 97112 NEUROMUSCULAR REEDUCATION: CPT

## 2023-07-24 PROCEDURE — 97110 THERAPEUTIC EXERCISES: CPT

## 2023-07-24 NOTE — PROGRESS NOTES
Daily Note     Today's date: 2023  Patient name: Valentino Race  : 2000  MRN: [id number]  Referring provider: Becky Garcia*  Dx:   Encounter Diagnosis     ICD-10-CM    1. Impingement of right shoulder  M25.811       2. Posture imbalance  R29.3                      Subjective: "My shoulder has been okay. I am good with day-to-day activities, but I really want to be able to push things, reach overhead, and throw."      Objective: See treatment diary below      Assessment: Session focused on OH movements, throwing progressions, and serratus activation. Tolerated well with fewer symptoms than previous sessions. Continue to progress with OH movements, throwing progress, serratus activation, and pushing movements as tolerated. Plan: Continue per plan of care.       Precautions: N/A      Manuals 7/10 7/12 7/17 7/24   R Shoulder PROM       R GHJ A-P/Inf [place]       CTJ       Thoracic P-A       Lumbar roll grV       SOR with rotation to the right       Multifidus isometric              Prone CTJ        Central [place] C7 with median/radial nerve glides        B pec and UT STM       Cervical and periscapular STM KA      Cervical side glides        Cupping B rhomboids / UT / LS KA     Static 3'    AROM cervical rot 10x, SB 10x, scapular protraction/retraction 20x ea   KM    Static 3'     AROM cervical rot 10x, SB 10x, scapular protraction/retraction 20x ea KA    Static 3'     AROM cervical rot 10x, SB 10x, scapular protraction/retraction 20x ea   Lumbar paraspinals STM       Lumbar P-A       PT Re-Evaluation  KA 10'     Neuro Re-Ed       Standing Shoulder Scaption       Prone Scapular Retractions       Supine pec Stretch on Foam Roll       SA punches       BOSU push ups plus       SA ball rolls         Wall push ups plus       Standing Rows        Prone Ts       Thoracic Extension       TRX Rows       LPD       I Y T  2# 2x10 5"     Incline Chest press       KB rot       [place] carry [employer] Serratus pushups       Prone Is       Wall slides with serratus press       Plank wobble board       Body blade  90 degrees shoulder abduction, elbow flexion 90 degrees A/P 20"x3 90 degrees shoulder abduction, elbow flexion 90 degrees A/P 20"x3 90 degrees shoulder flexion, elbow extension AP/ML, 180 degrees shoulder flexion, 20"x3 ea    Cervical SNAG       Chin retractions supine       Robots        Cat cow       rebounder  20x red DL, SLS B  20x red SLS B  20x red SLS B 20x red airex SLS B    Dead bug        UT TD  10"x10     My little pony    nv   Ther Ex       pulley       UBE arm bike for postural endurance training 8' retro standing  8' retro standing  8' retro standing 8' retro standing   Side plank        Walking plank circuit   BOSU and 6" pushup 1 ea, 3 sets of 4      Plank tap       full bankrobbers  GTB 3x8 1/2 bankrobber GTB 3x10 1/2 bankrobber GTB 3x10    1/2 kneel with OH press     10# 3x8    Standing B shoulder horizontal abd/add       Bench TB        Stir the pot       PBall rollout       TrX plank       TRX rows       MB 90/90 toss against wall        BOSU ball toss       TB snow angels  GTB 3x8      Plank row   nv     scap punches     8# 5"x8x3  ABCx1   Ther Activity       Pt Education       Bear Crawl        Standing ER ball taps Green ball 20x3 Green ball 20x3 Green ball 20x3 Green ball 20x3   Prone shoulder ER catch nv nv nv Green ball 20x3   Kneeling ER follow through catch Green ball 3x6 Green ball 3x6 Green ball 3x6 Green ball 4x6   Gait Training                     Modalities

## 2023-07-26 ENCOUNTER — OFFICE VISIT (OUTPATIENT)
Dept: PHYSICAL THERAPY | Facility: OTHER | Age: 23
End: 2023-07-26
Payer: COMMERCIAL

## 2023-07-26 DIAGNOSIS — R29.3 POSTURE IMBALANCE: ICD-10-CM

## 2023-07-26 DIAGNOSIS — M25.811 IMPINGEMENT OF RIGHT SHOULDER: Primary | ICD-10-CM

## 2023-07-26 PROCEDURE — 97110 THERAPEUTIC EXERCISES: CPT

## 2023-07-26 PROCEDURE — 97530 THERAPEUTIC ACTIVITIES: CPT

## 2023-07-26 PROCEDURE — 97112 NEUROMUSCULAR REEDUCATION: CPT

## 2023-07-26 NOTE — PROGRESS NOTES
Daily Note     Today's date: 2023  Patient name: Papito Palacios  : 2000  MRN: [id number]  Referring provider: Savita Veloz*  Dx:   Encounter Diagnosis     ICD-10-CM    1. Impingement of right shoulder  M25.811       2. Posture imbalance  R29.3                      Subjective: "My shoulder feels okay. I was sore after last time but not in pain."      Objective: See treatment diary below      Assessment: Session continued to focus on general UE strengthening with emphasis on throwing and WB. No symptoms present today, and reported feeling better with throwing progressions. Will continue to progress as tolerated, and continue to challenge, especially with rhythmic stab. Plan: Continue per plan of care.       Precautions: N/A      Manuals 7/10 7/12 7/17 7/24 7/26   R Shoulder PROM        R GHJ A-P/Inf [place]        CTJ        Thoracic P-A        Lumbar roll grV        SOR with rotation to the right        Multifidus isometric                Prone CTJ         Central [place] C7 with median/radial nerve glides         B pec and UT STM        Cervical and periscapular STM KA       Cervical side glides         Cupping B rhomboids / UT / LS KA     Static 3'    AROM cervical rot 10x, SB 10x, scapular protraction/retraction 20x ea   KM    Static 3'     AROM cervical rot 10x, SB 10x, scapular protraction/retraction 20x ea KA    Static 3'     AROM cervical rot 10x, SB 10x, scapular protraction/retraction 20x ea    Lumbar paraspinals STM        Lumbar P-A        PT Re-Evaluation  KA 10'      Neuro Re-Ed        Standing Shoulder Scaption        Prone Scapular Retractions        Supine pec Stretch on Foam Roll     Foam roll protocol 1' ea    SA punches        BOSU push ups plus        SA ball rolls          Wall push ups plus        Standing Rows         Prone Ts        Thoracic Extension        TRX Rows        LPD        I Y T  2# 2x10 5"      Incline Chest press        KB rot        [place] [address] [employer] [place] [employer] carry [employer] Serratus pushups        Prone Is        Wall slides with serratus press        Plank wobble board     [place] AP/ML/CW/CCW 20x ea    Body blade  90 degrees shoulder abduction, elbow flexion 90 degrees A/P 20"x3 90 degrees shoulder abduction, elbow flexion 90 degrees A/P 20"x3 90 degrees shoulder flexion, elbow extension AP/ML, 180 degrees shoulder flexion, 20"x3 ea  Shoulder abdction  degrees 8x3   Cervical SNAG        Chin retractions supine        Robots         Cat cow        rebounder  20x red DL, SLS B  20x red SLS B  20x red SLS B 20x red airex SLS B     Dead bug         UT TD  10"x10      My little pony    nv nv   PBall press rhythmic stab      15"x3    Ther Ex        pulley        UBE arm bike for postural endurance training 8' retro standing  8' retro standing  8' retro standing 8' retro standing 8' retro standing   Side plank         Walking plank circuit   BOSU and 6" pushup 1 ea, 3 sets of 4       Plank tap     On box 3x5 B 6"   full bankrobbers  GTB 3x8 1/2 bankrobber GTB 3x10 1/2 bankrobber GTB 3x10     1/2 kneel with OH press     10# 3x8  10# 3x8 standing    Standing B shoulder horizontal abd/add        Bench TB         Stir the pot        PBall rollout        TrX plank        TRX rows        MB 90/90 toss against wall         BOSU ball toss        TB snow angels  GTB 3x8       Plank row   nv      scap punches     8# 5"x8x3  ABCx1    Ther Activity        Pt Education        Bear Crawl         Standing ER ball taps Green ball 20x3 Green ball 20x3 Green ball 20x3 Green ball 20x3 Green ball 20x3   Prone shoulder ER catch nv nv nv Green ball 20x3 nv   Kneeling ER follow through catch Green ball 3x6 Green ball 3x6 Green ball 3x6 Green ball 4x6 Green ball 4x6   Gait Training                        Modalities

## 2023-07-31 ENCOUNTER — OFFICE VISIT (OUTPATIENT)
Dept: PHYSICAL THERAPY | Facility: OTHER | Age: 23
End: 2023-07-31
Payer: COMMERCIAL

## 2023-07-31 DIAGNOSIS — R29.3 POSTURE IMBALANCE: ICD-10-CM

## 2023-07-31 DIAGNOSIS — M25.811 IMPINGEMENT OF RIGHT SHOULDER: Primary | ICD-10-CM

## 2023-07-31 PROCEDURE — 97530 THERAPEUTIC ACTIVITIES: CPT

## 2023-07-31 PROCEDURE — 97110 THERAPEUTIC EXERCISES: CPT

## 2023-07-31 PROCEDURE — 97140 MANUAL THERAPY 1/> REGIONS: CPT

## 2023-07-31 PROCEDURE — 97112 NEUROMUSCULAR REEDUCATION: CPT

## 2023-07-31 NOTE — PROGRESS NOTES
Daily Note     Today's date: 2023  Patient name: Tucker Maldonado  : 2000  MRN: [id number]  Referring provider: Marylen Niemann*  Dx:   Encounter Diagnosis     ICD-10-CM    1. Impingement of right shoulder  M25.811       2. Posture imbalance  R29.3                    Total treatment time 5383-9561, 1-on-1 9015-0142  Subjective: "I played basketball yesterday and it felt pretty good. A few twinges but I haven't played in a really long time."      Objective: See treatment diary below      Assessment: Session continued to focus on throwing progress and OH strengthening, tolerated well. Much fatigue and some symptom reproduction noted with DB OH press. Will continue to challenge patient with OH movements, pushing, and rhythmic stabilization as tolerated. Plan: Continue per plan of care.       Precautions: N/A      Manuals 7/10 7/12 7/17 7/24 7/26 7/31   R Shoulder PROM         R GHJ A-P/Inf [place]         CTJ         Thoracic P-A         Lumbar roll grV         SOR with rotation to the right         Multifidus isometric                  Prone CTJ          Central [place] C7 with median/radial nerve glides          B pec and UT STM         Cervical and periscapular STM KA        Cervical side glides          Cupping B rhomboids / UT / LS KA     Static 3'    AROM cervical rot 10x, SB 10x, scapular protraction/retraction 20x ea   KM    Static 3'     AROM cervical rot 10x, SB 10x, scapular protraction/retraction 20x ea KA    Static 3'     AROM cervical rot 10x, SB 10x, scapular protraction/retraction 20x ea  KA    Static 3'     AROM cervical rot 10x, SB 10x, scapular protraction/retraction 20x ea   Lumbar paraspinals STM         Lumbar P-A         PT Re-Evaluation  KA 10'       Neuro Re-Ed         Standing Shoulder Scaption         Prone Scapular Retractions         Supine pec Stretch on Foam Roll     Foam roll protocol 1' ea     SA punches         BOSU push ups plus         SA ball rolls Wall push ups plus         Standing Rows          Prone Ts         Thoracic Extension         TRX Rows         LPD         I Y T  2# 2x10 5"       Incline Chest press         KB rot         [place] carry nv        Serratus pushups         Prone Is         Wall slides with serratus press         Plank wobble board     [place] AP/ML/CW/CCW 20x ea     Body blade  90 degrees shoulder abduction, elbow flexion 90 degrees A/P 20"x3 90 degrees shoulder abduction, elbow flexion 90 degrees A/P 20"x3 90 degrees shoulder flexion, elbow extension AP/ML, 180 degrees shoulder flexion, 20"x3 ea  Shoulder abdction  degrees 8x3    Cervical SNAG         Chin retractions supine         Robots          Cat cow         rebounder  20x red DL, SLS B  20x red SLS B  20x red SLS B 20x red airex SLS B   20x yellow airex SLS B   Dead bug          UT TD  10"x10       My little pony    nv nv MTB 3 sets 20"   PBall press rhythmic stab      15"x3  nv   Ther Ex         pulley         UBE arm bike for postural endurance training 8' retro standing  8' retro standing  8' retro standing 8' retro standing 8' retro standing 8' retro standing   Side plank          Walking plank circuit   BOSU and 6" pushup 1 ea, 3 sets of 4        Plank tap     On box 3x5 B 6"    full bankrobbers  GTB 3x8 1/2 bankrobber GTB 3x10 1/2 bankrobber GTB 3x10   B GTB 3x8   1/2 kneel with OH press     10# 3x8  10# 3x8 standing  Standing B 12# 3x6    Standing B shoulder horizontal abd/add         Bench TB          Stir the pot         PBall rollout         TrX plank         TRX rows         MB 90/90 toss against wall          BOSU ball toss         TB snow angels  GTB 3x8     GTB 3x8   Plank row   nv       scap punches     8# 5"x8x3  ABCx1     Ther Activity         Pt Education         Bear Crawl          Standing ER ball taps Green ball 20x3 Green ball 20x3 Green ball 20x3 Green ball 20x3 Green ball 20x3 Green ball 20x4 150 degrees abduction   Prone shoulder ER catch nv nv nv Green ball 20x3 nv Green ball 20x4   Kneeling ER follow through catch Green ball 3x6 Green ball 3x6 Green ball 3x6 Green ball 4x6 Green ball 4x6 Green ball 4x6 with quick follow through   Gait Training                           Modalities

## 2023-08-02 ENCOUNTER — APPOINTMENT (OUTPATIENT)
Dept: PHYSICAL THERAPY | Facility: OTHER | Age: 23
End: 2023-08-02
Payer: COMMERCIAL

## 2023-08-03 ENCOUNTER — APPOINTMENT (OUTPATIENT)
Dept: PHYSICAL THERAPY | Facility: OTHER | Age: 23
End: 2023-08-03
Payer: COMMERCIAL

## 2023-08-07 ENCOUNTER — OFFICE VISIT (OUTPATIENT)
Dept: PHYSICAL THERAPY | Facility: OTHER | Age: 23
End: 2023-08-07
Payer: COMMERCIAL

## 2023-08-07 DIAGNOSIS — R29.3 POSTURE IMBALANCE: ICD-10-CM

## 2023-08-07 DIAGNOSIS — M25.811 IMPINGEMENT OF RIGHT SHOULDER: Primary | ICD-10-CM

## 2023-08-07 PROCEDURE — 97110 THERAPEUTIC EXERCISES: CPT

## 2023-08-07 PROCEDURE — 97112 NEUROMUSCULAR REEDUCATION: CPT

## 2023-08-07 NOTE — PROGRESS NOTES
Daily Note     Today's date: 2023  Patient name: Jameson Hernandez  : 2000  MRN: [id number]  Referring provider: Ratna Omer  Dx:   Encounter Diagnosis     ICD-10-CM    1. Impingement of right shoulder  M25.811       2. Posture imbalance  R29.3                      Subjective: "My shoulder feels good. I was sore last week but it is better now."      Objective: See treatment diary below      Assessment: Pt continued to be challenged with today's session with focus on UE strengthening and CKC control. Minimal to no symptoms today. Will continue with farmers carries in different positions, stabilization exercises especially in a high plank, and blaze pods training for reaction time and multi-chain challenges. FOTO nv.       Plan: Continue per plan of care.       Precautions: N/A      Manuals    R Shoulder PROM       R GHJ A-P/Inf [place]       CTJ       Thoracic P-A       Lumbar roll grV       SOR with rotation to the right       Multifidus isometric              Prone CTJ        Central [place] C7 with median/radial nerve glides        B pec and UT STM       Cervical and periscapular STM       Cervical side glides        Cupping B rhomboids / UT / LS KA    Static 3'     AROM cervical rot 10x, SB 10x, scapular protraction/retraction 20x ea  KA    Static 3'     AROM cervical rot 10x, SB 10x, scapular protraction/retraction 20x ea    Lumbar paraspinals STM       Lumbar P-A       PT Re-Evaluation       Neuro Re-Ed       Standing Shoulder Scaption       Prone Scapular Retractions       Supine pec Stretch on Foam Roll  Foam roll protocol 1' ea      SA punches       BOSU push ups plus       SA ball rolls         Wall push ups plus       Standing Rows        Prone Ts       Thoracic Extension       TRX Rows       LPD       I Y T       Incline Chest press       KB rot       [place] carry       Serratus pushups    In plank 3x12   Prone Is       Wall slides with serratus press       Plank wobble board  [place] AP/ML/CW/CCW 20x ea      Body blade 90 degrees shoulder flexion, elbow extension AP/ML, 180 degrees shoulder flexion, 20"x3 ea  Shoulder abdction  degrees 8x3     Cervical SNAG       Chin retractions supine       Robots        Cat cow       rebounder  20x red airex SLS B   20x yellow airex SLS B nv   Dead bug        UT TD       My little pony nv nv MTB 3 sets 20" nv   PBall press rhythmic stab   15"x3  nv 15"x3   ER isometric 90 degrees    15x10"   Ther Ex       pulley       UBE arm bike for postural endurance training 8' retro standing 8' retro standing 8' retro standing 8' retro standing   Side plank        Walking plank circuit       Plank tap  On box 3x5 B 6"     full bankrobbers    B GTB 3x8    1/2 kneel with OH press  10# 3x8  10# 3x8 standing  Standing B 12# 3x6     Standing B shoulder horizontal abd/add    GTB abd   BTB add    3x10 ea    Bench TB        Stir the pot    10x CW,CCW, ABCX1   PBall rollout       TrX plank    nv with rhythmic stab   TRX rows       MB 90/90 toss against wall        BOSU ball toss       TB snow angels    GTB 3x8 GTB 3x8   Plank row        scap punches  8# 5"x8x3  ABCx1      Blaze pods plank tap     3 rounds 30", 3 blaze pods   Ther Activity       Pt Education       Bear Crawl        Standing ER ball taps Green ball 20x3 Green ball 20x3 Green ball 20x4 150 degrees abduction    Prone shoulder ER catch Green ball 20x3 nv Green ball 20x4    Kneeling ER follow through catch Green ball 4x6 Green ball 4x6 Green ball 4x6 with quick follow through    Gait Training                     Modalities

## 2023-08-09 ENCOUNTER — OFFICE VISIT (OUTPATIENT)
Dept: PHYSICAL THERAPY | Facility: OTHER | Age: 23
End: 2023-08-09
Payer: COMMERCIAL

## 2023-08-09 DIAGNOSIS — R29.3 POSTURE IMBALANCE: ICD-10-CM

## 2023-08-09 DIAGNOSIS — M25.811 IMPINGEMENT OF RIGHT SHOULDER: Primary | ICD-10-CM

## 2023-08-09 PROCEDURE — 97110 THERAPEUTIC EXERCISES: CPT

## 2023-08-09 PROCEDURE — 97112 NEUROMUSCULAR REEDUCATION: CPT

## 2023-08-09 PROCEDURE — 97140 MANUAL THERAPY 1/> REGIONS: CPT

## 2023-08-09 NOTE — PROGRESS NOTES
Daily Note     Today's date: 2023  Patient name: Valeria Santizo  : 2000  MRN: [id number]  Referring provider: Dinesh Pizano*  Dx:   Encounter Diagnosis     ICD-10-CM    1. Impingement of right shoulder  M25.811       2. Posture imbalance  R29.3                    Total treatment time 5895-0325, 1-on- 9265-8321  Subjective: "My arm was a little sore after last time but nowhere near when we did flies."      Objective: See treatment diary below      Assessment: Session continued to focus on OH strengthening, plank stability, rhythmic stab, and throwing. Tolerated well, trialed new exercises with some feelings of soreness. Large decrease in FOTO today. Pt requested cupping for pecs today, responded with relief. Will continue to progress OH, CKC, rhythmic stab, and throwing as tolerated. Continue to progress with blaze pods to incorporate reaction time. Plan: Continue per plan of care.       Precautions: N/A      Manuals    R Shoulder PROM        R GHJ A-P/Inf [place]        CTJ        Thoracic P-A        Lumbar roll grV        SOR with rotation to the right        Multifidus isometric                Prone CTJ         Central [place] C7 with median/radial nerve glides         B pec and UT STM        Cervical and periscapular STM        Cervical side glides         Cupping B rhomboids / UT / LS KA    Static 3'     AROM cervical rot 10x, SB 10x, scapular protraction/retraction 20x ea  KA    Static 3'     AROM cervical rot 10x, SB 10x, scapular protraction/retraction 20x ea     Lumbar paraspinals STM        Lumbar P-A        PT Re-Evaluation        Cupping pec and anterior shoulder B      KA static 3'    AROM snow angels supine 10x   Neuro Re-Ed        Standing Shoulder Scaption        Prone Scapular Retractions        Supine pec Stretch on Foam Roll  Foam roll protocol 1' ea       SA punches        BOSU push ups plus        SA ball rolls          Wall push ups plus Standing Rows         Prone Ts        Thoracic Extension        TRX Rows        LPD        I Y T        Incline Chest press        KB rot     2x8 10#  1x8 7.5#   [place] carry     10# 20"x3 90 degrees shoulder flexion   Serratus pushups    In plank 3x12    Prone Is        Wall slides with serratus press        Plank wobble board  [place] AP/ML/CW/CCW 20x ea    [place] AP/ML/CW/CCW 10x3 ea    Body blade 90 degrees shoulder flexion, elbow extension AP/ML, 180 degrees shoulder flexion, 20"x3 ea  Shoulder abdction  degrees 8x3      Cervical SNAG        Chin retractions supine        Robots         Cat cow        rebounder  20x red airex SLS B   20x yellow airex SLS B nv 20x red airex SLS B   Dead bug         UT TD        My little pony nv nv MTB 3 sets 20" nv nv   PBall press rhythmic stab   15"x3  nv 15"x3 90 degrees 15"x3 130 degrees   ER isometric 90 degrees    15x10"    Ther Ex        pulley        UBE arm bike for postural endurance training 8' retro standing 8' retro standing 8' retro standing 8' retro standing 8' retro standing   Side plank         Walking plank circuit        Plank tap  On box 3x5 B 6"      full bankrobbers    B GTB 3x8     1/2 kneel with OH press  10# 3x8  10# 3x8 standing  Standing B 12# 3x6   10# 3x8 standing    Standing B shoulder horizontal abd/add    GTB abd   BTB add    3x10 ea     Bench TB         Stir the pot    10x CW,CCW, ABCX1    PBall rollout        TrX plank    nv with rhythmic stab 15"x4 high plank hold with rhythmic stab   TRX rows        MB 90/90 toss against wall         BOSU ball toss        TB snow angels    GTB 3x8 GTB 3x8    Plank row         scap punches  8# 5"x8x3  ABCx1       Blaze pods plank tap     3 rounds 30", 3 blaze pods    Ther Activity        Pt Education        Bear Crawl         Standing ER ball taps Green ball 20x3 Green ball 20x3 Green ball 20x4 150 degrees abduction     Prone shoulder ER catch Green ball 20x3 nv Green ball 20x4  Green ball 20x3 Kneeling ER follow through catch Green ball 4x6 Green ball 4x6 Green ball 4x6 with quick follow through     Gait Training                        Modalities

## 2023-08-14 ENCOUNTER — OFFICE VISIT (OUTPATIENT)
Dept: PHYSICAL THERAPY | Facility: OTHER | Age: 23
End: 2023-08-14
Payer: COMMERCIAL

## 2023-08-14 DIAGNOSIS — R29.3 POSTURE IMBALANCE: ICD-10-CM

## 2023-08-14 DIAGNOSIS — M25.811 IMPINGEMENT OF RIGHT SHOULDER: Primary | ICD-10-CM

## 2023-08-14 PROCEDURE — 97110 THERAPEUTIC EXERCISES: CPT

## 2023-08-14 PROCEDURE — 97112 NEUROMUSCULAR REEDUCATION: CPT

## 2023-08-14 PROCEDURE — 97140 MANUAL THERAPY 1/> REGIONS: CPT

## 2023-08-14 NOTE — PROGRESS NOTES
Daily Note     Today's date: 2023  Patient name: Severo Aliment  : 2000  MRN: [id number]  Referring provider: Enrique Malcolm*  Dx:   Encounter Diagnosis     ICD-10-CM    1. Impingement of right shoulder  M25.811       2. Posture imbalance  R29.3                      Subjective: "I am feeling okay. I feel good during PT and when I leave but then the pain comes back. I feel like I am plateauing."      Objective: See treatment diary below      Assessment: Discussed with patient the need for an MRI, as he has been challenged in PT with minimal to no symptoms during session but continued pain functionally. Pt states his MRI has been approved but needs to find locations that accommodate his insurance. Discussed that being stable in a plank position and OH presses are his biggest difficulties and areas of pain currently. Will continue to progress with these functional activities as tolerated. Plan: Continue per plan of care.       Precautions: N/A      Manuals    R Shoulder PROM         R GHJ A-P/Inf [place]         CTJ         Thoracic P-A         Lumbar roll grV         SOR with rotation to the right         Multifidus isometric                  Prone CTJ          Central [place] C7 with median/radial nerve glides          B pec and UT STM         Cervical and periscapular STM         Cervical side glides          Cupping B rhomboids / UT / LS KA    Static 3'     AROM cervical rot 10x, SB 10x, scapular protraction/retraction 20x ea  KA    Static 3'     AROM cervical rot 10x, SB 10x, scapular protraction/retraction 20x ea   KA    Static 3'     AROM cervical rot 10x, SB 10x, scapular protraction/retraction 20x ea   Lumbar paraspinals STM         Lumbar P-A         PT Re-Evaluation         Cupping pec and anterior shoulder B      KA static 3'    AROM snow angels supine 10x    Neuro Re-Ed         Standing Shoulder Scaption         Prone Scapular Retractions Supine pec Stretch on Foam Roll  Foam roll protocol 1' ea        SA punches         BOSU push ups plus         SA ball rolls           Wall push ups plus         Standing Rows          Prone Ts         Thoracic Extension         TRX Rows         LPD         I Y T         Incline Chest press         KB rot     2x8 10#  1x8 7.5#    [place] carry     10# 20"x3 90 degrees shoulder flexion    Serratus pushups    In plank 3x12     Prone Is         Wall slides with serratus press         Plank wobble board  [place] AP/ML/CW/CCW 20x ea    [place] AP/ML/CW/CCW 10x3 ea     Body blade 90 degrees shoulder flexion, elbow extension AP/ML, 180 degrees shoulder flexion, 20"x3 ea  Shoulder abdction  degrees 8x3       Cervical SNAG         Chin retractions supine         Robots          Cat cow         rebounder  20x red airex SLS B   20x yellow airex SLS B nv 20x red airex SLS B    Dead bug          UT TD         My little pony nv nv MTB 3 sets 20" nv nv    PBall press rhythmic stab   15"x3  nv 15"x3 90 degrees 15"x3 130 degrees    ER isometric 90 degrees    15x10"     Plank rhythmic stab       Reverse BOSU 20"  BOSU 20"  [place] wobble 20"x2   PBall on wall 1 HHA      3x5 switch   Ther Ex         pulley         UBE arm bike for postural endurance training 8' retro standing 8' retro standing 8' retro standing 8' retro standing 8' retro standing 8' retro standing   Side plank          Walking plank circuit         Plank tap  On box 3x5 B 6"       full bankrobbers    B GTB 3x8   B GTB 3x8   1/2 kneel with OH press  10# 3x8  10# 3x8 standing  Standing B 12# 3x6   10# 3x8 standing  10# 3x8   Standing B shoulder horizontal abd/add    GTB abd   BTB add    3x10 ea      Bench TB          Stir the pot    10x CW,CCW, ABCX1     PBall rollout         TrX plank    nv with rhythmic stab 15"x4 high plank hold with rhythmic stab    TRX rows         MB 90/90 toss against wall          BOSU ball toss         TB snow angels    GTB 3x8 GTB 3x8 Plank row          scap punches  8# 5"x8x3  ABCx1        Blaze pods plank tap     3 rounds 30", 3 blaze pods  Plank feet on BOSU 4 pods 30"x3    Ther Activity         Pt Education         Bear Crawl          Standing ER ball taps Green ball 20x3 Green ball 20x3 Green ball 20x4 150 degrees abduction      Prone shoulder ER catch Green ball 20x3 nv Green ball 20x4  Green ball 20x3    Kneeling ER follow through catch Green ball 4x6 Green ball 4x6 Green ball 4x6 with quick follow through      Gait Training                           Modalities

## 2023-08-16 ENCOUNTER — OFFICE VISIT (OUTPATIENT)
Dept: PHYSICAL THERAPY | Facility: OTHER | Age: 23
End: 2023-08-16
Payer: COMMERCIAL

## 2023-08-16 DIAGNOSIS — R29.3 POSTURE IMBALANCE: ICD-10-CM

## 2023-08-16 DIAGNOSIS — M25.811 IMPINGEMENT OF RIGHT SHOULDER: Primary | ICD-10-CM

## 2023-08-16 PROCEDURE — 97110 THERAPEUTIC EXERCISES: CPT

## 2023-08-16 PROCEDURE — 97112 NEUROMUSCULAR REEDUCATION: CPT

## 2023-08-16 PROCEDURE — 97140 MANUAL THERAPY 1/> REGIONS: CPT

## 2023-08-16 NOTE — PROGRESS NOTES
Daily Note     Today's date: 2023  Patient name: Parth Espinoza  : 2000  MRN: [id number]  Referring provider: Shira Vega  Dx:   Encounter Diagnosis     ICD-10-CM    1. Impingement of right shoulder  M25.811       2. Posture imbalance  R29.3                    Total treatment time 1194-8059, 1-on- 2388-4038  Subjective: "My shoulder feels okay, my neck is sore."      Objective: See treatment diary below      Assessment: Session focused on OH strengthening and rhythmic stabilization. Tolerated well without presence of symptoms. Will continue to challenge with WB activities, OH strengthening, rhythmic stabilization, and pushing progressions as tolerated. Plan: Continue per plan of care.       Precautions: N/A      Manuals    R Shoulder PROM       R GHJ A-P/Inf [place]       CTJ       Thoracic P-A       Lumbar roll grV       SOR with rotation to the right       Multifidus isometric              Prone CTJ        Central [place] C7 with median/radial nerve glides        B pec and UT STM       Cervical and periscapular STM       Cervical side glides        Cupping B rhomboids / UT / LS   KA    Static 3'     AROM cervical rot 10x, SB 10x, scapular protraction/retraction 20x ea KA    Static 3'     AROM cervical rot 10x, SB 10x, scapular protraction/retraction 20x ea   Lumbar paraspinals STM       Lumbar P-A       PT Re-Evaluation       Cupping pec and anterior shoulder B   KA static 3'    AROM snow angels supine 10x     Neuro Re-Ed       Standing Shoulder Scaption       Prone Scapular Retractions       Supine pec Stretch on Foam Roll       SA punches       BOSU push ups plus       SA ball rolls         Wall push ups plus       Standing Rows        Prone Ts       Thoracic Extension       TRX Rows       LPD       I Y T       Incline Chest press       KB rot  2x8 10#  1x8 7.5#     [place] carry  10# 20"x3 90 degrees shoulder flexion     Serratus pushups In plank 3x12      Prone Is       Wall slides with serratus press       Plank wobble board  [place] AP/ML/CW/CCW 10x3 ea      Body blade       Cervical SNAG       Chin retractions supine       Robots        Cat cow       rebounder  nv 20x red airex SLS B     Dead bug        UT TD       My little pony nv nv     PBall press rhythmic stab  15"x3 90 degrees 15"x3 130 degrees     ER isometric 90 degrees 15x10"      Plank rhythmic stab    Reverse BOSU 20"  BOSU 20"  [place] wobble 20"x2    PBall on wall 1 HHA   3x5 switch 3x4 B switch with rhythmic stab   Reverse BOSU plank shoulder tap    3x10   OH press with [place] carry    30"x3 2.5# L, 11# R   Ther Ex       pulley       UBE arm bike for postural endurance training 8' retro standing 8' retro standing 8' retro standing 8' retro standing   Side plank        Walking plank circuit       Plank tap       full bankrobbers    B GTB 3x8 R GTB  L BTB  3x10 ea    1/2 kneel with OH press   10# 3x8 standing  10# 3x8    Standing B shoulder horizontal abd/add GTB abd   BTB add    3x10 ea       Bench TB        Stir the pot 10x CW,CCW, ABCX1      PBall rollout       TrX plank nv with rhythmic stab 15"x4 high plank hold with rhythmic stab  15"x3 high plank hold with rhythmic stab   TRX rows       MB 90/90 toss against wall        BOSU ball toss       TB snow angels  GTB 3x8      Plank row        scap punches        Blaze pods plank tap  3 rounds 30", 3 blaze pods  Plank feet on BOSU 4 pods 30"x3     Ther Activity       Pt Education       Bear Crawl        Standing ER ball taps       Prone shoulder ER catch  Green ball 20x3     Kneeling ER follow through catch       Gait Training                     Modalities

## 2023-08-22 ENCOUNTER — OFFICE VISIT (OUTPATIENT)
Dept: PHYSICAL THERAPY | Facility: OTHER | Age: 23
End: 2023-08-22
Payer: COMMERCIAL

## 2023-08-22 DIAGNOSIS — R29.3 POSTURE IMBALANCE: ICD-10-CM

## 2023-08-22 DIAGNOSIS — M25.811 IMPINGEMENT OF RIGHT SHOULDER: Primary | ICD-10-CM

## 2023-08-22 PROCEDURE — 97110 THERAPEUTIC EXERCISES: CPT | Performed by: PEDIATRICS

## 2023-08-22 PROCEDURE — 97112 NEUROMUSCULAR REEDUCATION: CPT | Performed by: PEDIATRICS

## 2023-08-22 PROCEDURE — 97140 MANUAL THERAPY 1/> REGIONS: CPT | Performed by: PEDIATRICS

## 2023-08-22 NOTE — PROGRESS NOTES
Daily Note     Today's date: 2023  Patient name: Lauri Culp  : 2000  MRN: [id number]  Referring provider: Lashay Martínez*  Dx:   Encounter Diagnosis     ICD-10-CM    1. Impingement of right shoulder  M25.811       2. Posture imbalance  R29.3                    1:1 with PTA EW for duration of treatment session. Subjective: "My shoulder is a little more sore today. My collar bone is bothering me."      Objective: See treatment diary below      Assessment: Continued with exercises as outlined. Did not progress today secondary to increased soreness in shoulder. Will continue to challenge with WB activities, OH strengthening, rhythmic stabilization, and pushing progressions as tolerated. Plan: Continue per plan of care.       Precautions: N/A      Manuals    SCJ AP gr 3-4     EW   R GHJ A-P/Inf [place]        CTJ        Thoracic P-A        Lumbar roll grV        SOR with rotation to the right        Multifidus isometric                Prone CTJ         Central [place] C7 with median/radial nerve glides         B pec and UT STM        Cervical and periscapular STM        Cervical side glides         Cupping B rhomboids / UT / LS   KA    Static 3'     AROM cervical rot 10x, SB 10x, scapular protraction/retraction 20x ea KA    Static 3'     AROM cervical rot 10x, SB 10x, scapular protraction/retraction 20x ea EW    Static 3'     AROM cervical rot 10x, SB 10x, scapular protraction/retraction 20x ea   Lumbar paraspinals STM        Lumbar P-A        PT Re-Evaluation        Cupping pec and anterior shoulder B   KA static 3'    AROM snow angels supine 10x      Neuro Re-Ed        Standing Shoulder Scaption        Prone Scapular Retractions        Supine pec Stretch on Foam Roll        SA punches        BOSU push ups plus        SA ball rolls          Wall push ups plus        Standing Rows         Prone Ts        Thoracic Extension        TRX Rows        LPD        I Y T Incline Chest press        KB rot  2x8 10#  1x8 7.5#      [place] carry  10# 20"x3 90 degrees shoulder flexion      Serratus pushups In plank 3x12       Prone Is        Wall slides with serratus press        Plank wobble board  [place] AP/ML/CW/CCW 10x3 ea       Body blade        Cervical SNAG        Chin retractions supine        Robots         Cat cow        rebounder  nv 20x red airex SLS B      Dead bug         UT TD        My little pony nv nv      PBall press rhythmic stab  15"x3 90 degrees 15"x3 130 degrees      ER isometric 90 degrees 15x10"       Plank rhythmic stab    Reverse BOSU 20"  BOSU 20"  [place] wobble 20"x2     PBall on wall 1 HHA   3x5 switch 3x4 B switch with rhythmic stab 3x4 B switch with rhythmic stab   Reverse BOSU plank shoulder tap    3x10 3x10   OH press with [place] carry    30"x3 2.5# L, 11# R    Ther Ex        pulley        UBE arm bike for postural endurance training 8' retro standing 8' retro standing 8' retro standing 8' retro standing 8' retro standing   Side plank         Walking plank circuit        Plank tap        full bankrobbers    B GTB 3x8 R GTB  L BTB  3x10 ea  bilat  BTB  3x10   1/2 kneel with OH press   10# 3x8 standing  10# 3x8     Standing B shoulder horizontal abd/add GTB abd   BTB add    3x10 ea        Bench TB         Stir the pot 10x CW,CCW, ABCX1       PBall rollout        TrX plank nv with rhythmic stab 15"x4 high plank hold with rhythmic stab  15"x3 high plank hold with rhythmic stab 15"x3 high plank hold with rhythmic stab   TRX rows        MB 90/90 toss against wall         BOSU ball toss        TB snow angels  GTB 3x8       Plank row         scap punches         Blaze pods plank tap  3 rounds 30", 3 blaze pods  Plank feet on BOSU 4 pods 30"x3   3 rounds 30", 3 blaze pods   Ther Activity        Pt Education        Bear Crawl         Standing ER ball taps        Prone shoulder ER catch  Green ball 20x3      Kneeling ER follow through catch        Gait Training Modalities

## 2023-08-24 ENCOUNTER — OFFICE VISIT (OUTPATIENT)
Dept: PHYSICAL THERAPY | Facility: OTHER | Age: 23
End: 2023-08-24
Payer: COMMERCIAL

## 2023-08-24 DIAGNOSIS — M25.811 IMPINGEMENT OF RIGHT SHOULDER: Primary | ICD-10-CM

## 2023-08-24 DIAGNOSIS — R29.3 POSTURE IMBALANCE: ICD-10-CM

## 2023-08-24 PROCEDURE — 97112 NEUROMUSCULAR REEDUCATION: CPT

## 2023-08-24 PROCEDURE — 97110 THERAPEUTIC EXERCISES: CPT

## 2023-08-24 PROCEDURE — 97140 MANUAL THERAPY 1/> REGIONS: CPT

## 2023-08-24 NOTE — PROGRESS NOTES
Daily Note     Today's date: 2023  Patient name: Nelson Fox  : 2000  MRN: [id number]  Referring provider: Laquita Chicas*  Dx:   Encounter Diagnosis     ICD-10-CM    1. Impingement of right shoulder  M25.811       2. Posture imbalance  R29.3                      Subjective: I was pretty sore after last time."      Objective: See treatment diary below      Assessment: Discussed with patient current progress thus far and how he is not fully satisfied with his progress, as he still has pain in the R shoulder, especially with UE movements above 90 degrees of flexion and in full ER and abduction. Trial ACJ mob which alleviated symptoms briefly. Continued to challenge UE strengthening, rhythmic stabilization, and weight-bearing tolerance. Will continue to progress as tolerated. Discuss further plans with physical therapy after patient schedules MRI. Plan: Continue per plan of care.       Precautions: N/A      Manuals    SCJ AP gr 3-4  KA KA   ACJ inferior gr3-4   KA    R GHJ A-P/Inf [place]      CTJ      Thoracic P-A      Lumbar roll grV      SOR with rotation to the right      Multifidus isometric            Prone CTJ       Central [place] C7 with median/radial nerve glides       B pec and UT STM      Cervical and periscapular STM      Cervical side glides       Cupping B rhomboids / UT / LS KA    Static 3'     AROM cervical rot 10x, SB 10x, scapular protraction/retraction 20x ea EW    Static 3'     AROM cervical rot 10x, SB 10x, scapular protraction/retraction 20x ea KA    Static 3'     AROM cervical rot 10x, SB 10x, scapular protraction/retraction 20x ea   Lumbar paraspinals STM      Lumbar P-A      PT Re-Evaluation      Cupping pec and anterior shoulder B       Neuro Re-Ed      Standing Shoulder Scaption      Prone Scapular Retractions      Supine pec Stretch on Foam Roll      SA punches      BOSU push ups plus      SA ball rolls        Wall push ups plus      Standing Rows       Prone Ts      Thoracic Extension      TRX Rows      LPD      I Y T      Incline Chest press      KB rot      [place] carry      Serratus pushups      Prone Is      Wall slides with serratus press      Plank wobble board      Body blade      Cervical SNAG      Chin retractions supine      Robots       Cat cow      rebounder       Dead bug       UT TD      My little pony      PBall press rhythmic stab       ER isometric 90 degrees      Plank rhythmic stab       PBall on wall 1 HHA 3x4 B switch with rhythmic stab 3x4 B switch with rhythmic stab    Reverse BOSU plank shoulder tap 3x10 3x10    OH press with dowel carry 30"x3 2.5# L, 11# R     TrX plank push up   3x8   Ther Ex      pulley      UBE arm bike for postural endurance training 8' retro standing 8' retro standing 8' retro standing    Side plank       Walking plank circuit      Plank tap      full bankrobbers  R GTB  L BTB  3x10 ea  bilat  BTB  3x10 Bilateral BTB 4x8   1/2 kneel with OH press       Standing B shoulder horizontal abd/add      Bench TB       Stir the pot      PBall rollout      TrX plank 15"x3 high plank hold with rhythmic stab 15"x3 high plank hold with rhythmic stab    TRX rows      MB 90/90 toss against wall       BOSU ball toss      TB snow angels    GTB 3x10   RTC lift straight up    GTB 4x12   Plank row       scap punches       Blaze pods plank tap   3 rounds 30", 3 blaze pods    Ther Activity      Pt Education      Bear Crawl       Standing ER ball taps      Prone shoulder ER catch      Kneeling ER follow through catch      Gait Training                  Modalities

## 2023-08-28 ENCOUNTER — OFFICE VISIT (OUTPATIENT)
Dept: PHYSICAL THERAPY | Facility: OTHER | Age: 23
End: 2023-08-28
Payer: COMMERCIAL

## 2023-08-28 DIAGNOSIS — M25.811 IMPINGEMENT OF RIGHT SHOULDER: Primary | ICD-10-CM

## 2023-08-28 DIAGNOSIS — R29.3 POSTURE IMBALANCE: ICD-10-CM

## 2023-08-28 PROCEDURE — 97112 NEUROMUSCULAR REEDUCATION: CPT

## 2023-08-28 PROCEDURE — 97110 THERAPEUTIC EXERCISES: CPT

## 2023-08-28 PROCEDURE — 97140 MANUAL THERAPY 1/> REGIONS: CPT

## 2023-08-28 NOTE — PROGRESS NOTES
Daily Note     Today's date: 2023  Patient name: Nhan Fraga  : 2000  MRN: [id number]  Referring provider: Philip Estrada  Dx:   Encounter Diagnosis     ICD-10-CM    1. Impingement of right shoulder  M25.811       2. Posture imbalance  R29.3                    Total treatment time 9360-6967, 1-on-1 2450-8424  Subjective: "I felt fine after therapy last week, but I felt like it got worse over the weekend."      Objective: See treatment diary below      Assessment: Symptoms of pain with shoulder flexion/abduction completely abolished with bicipital tendon cupping today. Will assess response in future visits, possible IASTM bicep nv. Remainder of session focus on posterior cuff strengthening. Pt tolerated well without presence of pain but some residual soreness from cupping. Iso 9090 nv. FOTO nv. Continue to progress as tolerated        Plan: Continue per plan of care.       Precautions: N/A      Manuals    SCJ AP gr 3-4  KA KA    ACJ inferior gr3-4   KA     R GHJ A-P/Inf [place]       CTJ       Thoracic P-A       Lumbar roll grV       SOR with rotation to the right       Multifidus isometric              Prone CTJ        Central [place] C7 with median/radial nerve glides        B pec and UT STM       Cervical and periscapular STM       Cervical side glides        Cupping B rhomboids / UT / LS KA    Static 3'     AROM cervical rot 10x, SB 10x, scapular protraction/retraction 20x ea EW    Static 3'     AROM cervical rot 10x, SB 10x, scapular protraction/retraction 20x ea KA    Static 3'     AROM cervical rot 10x, SB 10x, scapular protraction/retraction 20x ea KA    Static 3'     AROM cervical rot 10x, SB 10x, scapular protraction/retraction 20x ea   Cupping R anterior shoulder and biceps tendon    KA    Static 3'    AROM shoulder extension and L SB 15x    Lumbar paraspinals STM       Lumbar P-A       PT Re-Evaluation       Cupping pec and anterior shoulder B        Neuro Re-Ed       Standing Shoulder Scaption       Prone Scapular Retractions       Supine pec Stretch on Foam Roll       SA punches       BOSU push ups plus       SA ball rolls         Wall push ups plus       Standing Rows        Prone Ts       Thoracic Extension       TRX Rows       LPD       I Y T    2# 3x10 YT   Incline Chest press       KB rot       [place] carry       Serratus pushups       Prone Is       Wall slides with serratus press       Plank wobble board       Body blade       Cervical SNAG       Chin retractions supine       Robots        Cat cow       rebounder        Dead bug        UT TD       My little pony       PBall press rhythmic stab        ER isometric 90 degrees       Plank rhythmic stab        PBall on wall 1 HHA 3x4 B switch with rhythmic stab 3x4 B switch with rhythmic stab     Reverse BOSU plank shoulder tap 3x10 3x10     OH press with dowel carry 30"x3 2.5# L, 11# R      TrX plank push up   3x8    SL ER     5# 3x10   Ther Ex       pulley       UBE arm bike for postural endurance training 8' retro standing 8' retro standing 8' retro standing  8' retro standing    Side plank        Walking plank circuit       Plank tap       full bankrobbers  R GTB  L BTB  3x10 ea  bilat  BTB  3x10 Bilateral BTB 4x8    1/2 kneel with OH press        Standing B shoulder horizontal abd/add       Bench TB        Stir the pot       PBall rollout       TrX plank 15"x3 high plank hold with rhythmic stab 15"x3 high plank hold with rhythmic stab     TRX rows       MB 90/90 toss against wall        BOSU ball toss       TB snow angels    GTB 3x10    RTC lift straight up    GTB 4x12 GTB 3x10   Plank row        scap punches        Blaze pods plank tap   3 rounds 30", 3 blaze pods     Ther Activity       Pt Education       Bear Crawl        Standing ER ball taps       Prone shoulder ER catch       Kneeling ER follow through catch       Gait Training                     Modalities

## 2023-08-30 ENCOUNTER — APPOINTMENT (OUTPATIENT)
Dept: PHYSICAL THERAPY | Facility: OTHER | Age: 23
End: 2023-08-30
Payer: COMMERCIAL

## 2023-09-06 ENCOUNTER — OFFICE VISIT (OUTPATIENT)
Dept: PHYSICAL THERAPY | Facility: OTHER | Age: 23
End: 2023-09-06
Payer: COMMERCIAL

## 2023-09-06 DIAGNOSIS — M25.811 IMPINGEMENT OF RIGHT SHOULDER: Primary | ICD-10-CM

## 2023-09-06 DIAGNOSIS — R29.3 POSTURE IMBALANCE: ICD-10-CM

## 2023-09-06 PROCEDURE — 97110 THERAPEUTIC EXERCISES: CPT

## 2023-09-06 PROCEDURE — 97140 MANUAL THERAPY 1/> REGIONS: CPT

## 2023-09-06 PROCEDURE — 97112 NEUROMUSCULAR REEDUCATION: CPT

## 2023-09-06 NOTE — PROGRESS NOTES
Daily Note     Today's date: 2023  Patient name: Justin Araiza  : 2000  MRN: [id number]  Referring provider: Alma Arenas*  Dx:   Encounter Diagnosis     ICD-10-CM    1. Impingement of right shoulder  M25.811       2. Posture imbalance  R29.3                    Total treatment time 9734-8905, 1-on-1 3026-6293  Subjective: "My shoulder feels pretty good, my neck not so much."      Objective: See treatment diary below      Assessment: Continued with same session as last with increased weight. Tolerated well, minimal symptoms present with prone Is as patient felt his GHJ was pushing anteriorly. Will reassess nv. Trialed IASTM on bicipital tendon. Pt also bought cups for home, will use as needed. Continue to progress as tolerated. iso 9090 nv.    Plan: Continue per plan of care.       Precautions: N/A      Manuals    SCJ AP gr 3-4  KA KA     ACJ inferior gr3-4   KA      R GHJ A-P/Inf [place]        CTJ        Thoracic P-A        Lumbar roll grV        SOR with rotation to the right        Multifidus isometric                Prone CTJ         Central [place] C7 with median/radial nerve glides         B pec and UT STM        Cervical and periscapular STM        Cervical side glides         Cupping B rhomboids / UT / LS KA    Static 3'     AROM cervical rot 10x, SB 10x, scapular protraction/retraction 20x ea EW    Static 3'     AROM cervical rot 10x, SB 10x, scapular protraction/retraction 20x ea KA    Static 3'     AROM cervical rot 10x, SB 10x, scapular protraction/retraction 20x ea KA    Static 3'     AROM cervical rot 10x, SB 10x, scapular protraction/retraction 20x ea KA    Static 3'     AROM cervical rot 10x, SB 10x, scapular protraction/retraction 20x ea   Cupping R anterior shoulder and biceps tendon    KA    Static 3'    AROM shoulder extension and L SB 15x  KA    Static 3'    AROM shoulder extension and L SB 15x    Lumbar paraspinals STM        Lumbar P-A        PT Re-Evaluation        Cupping pec and anterior shoulder B         Neuro Re-Ed        Standing Shoulder Scaption        Prone Scapular Retractions        Supine pec Stretch on Foam Roll        SA punches        BOSU push ups plus        SA ball rolls          Wall push ups plus        Standing Rows         Prone Ts        Thoracic Extension        TRX Rows        LPD        I Y T    2# 3x10 YT 3# 3x10 YTI   Incline Chest press        KB rot        [place] carry        Serratus pushups        Prone Is        Wall slides with serratus press        Plank wobble board        Body blade        Cervical SNAG        Chin retractions supine        Robots         Cat cow        rebounder         Dead bug         UT TD        My little pony        PBall press rhythmic stab         ER isometric 90 degrees        Plank rhythmic stab         PBall on wall 1 HHA 3x4 B switch with rhythmic stab 3x4 B switch with rhythmic stab      Reverse BOSU plank shoulder tap 3x10 3x10      OH press with dowel carry 30"x3 2.5# L, 11# R       TrX plank push up   3x8     SL ER     5# 3x10 5# 3x10   Ther Ex        pulley        UBE arm bike for postural endurance training 8' retro standing 8' retro standing 8' retro standing  8' retro standing  8' retro standing   Side plank         Walking plank circuit        Plank tap        full bankrobbers  R GTB  L BTB  3x10 ea  bilat  BTB  3x10 Bilateral BTB 4x8     1/2 kneel with OH press         Standing B shoulder horizontal abd/add        Bench TB         Stir the pot        PBall rollout        TrX plank 15"x3 high plank hold with rhythmic stab 15"x3 high plank hold with rhythmic stab      TRX rows        MB 90/90 toss against wall         BOSU ball toss        TB snow angels    GTB 3x10     RTC lift straight up    GTB 4x12 GTB 3x10 GTB 3x10   Plank row         scap punches         Blaze pods plank tap   3 rounds 30", 3 blaze pods      Ther Activity        Pt Education        [address] Crawl         Standing ER ball taps        Prone shoulder ER catch        Kneeling ER follow through catch        Gait Training                        Modalities

## 2023-09-11 ENCOUNTER — OFFICE VISIT (OUTPATIENT)
Dept: PHYSICAL THERAPY | Facility: OTHER | Age: 23
End: 2023-09-11
Payer: COMMERCIAL

## 2023-09-11 DIAGNOSIS — M25.811 IMPINGEMENT OF RIGHT SHOULDER: Primary | ICD-10-CM

## 2023-09-11 DIAGNOSIS — R29.3 POSTURE IMBALANCE: ICD-10-CM

## 2023-09-11 PROCEDURE — 97140 MANUAL THERAPY 1/> REGIONS: CPT

## 2023-09-11 PROCEDURE — 97112 NEUROMUSCULAR REEDUCATION: CPT

## 2023-09-11 PROCEDURE — 97110 THERAPEUTIC EXERCISES: CPT

## 2023-09-11 NOTE — PROGRESS NOTES
Daily Note     Today's date: 2023  Patient name: Beronica Campuzano  : 2000  MRN: [id number]  Referring provider: Indira Vazquez*  Dx:   Encounter Diagnosis     ICD-10-CM    1. Impingement of right shoulder  M25.811       2. Posture imbalance  R29.3                      Subjective: "My shoulder feels pretty good. The pain is still with overhead movements, but the pain sometimes comes and sometimes not. It is very inconsistent."      Objective: See treatment diary below      Assessment: Same session as prior with addition of iso ER/IR on wall, fatigued at end of session and required frequent rest breaks. Corrected form with Is, as patient stated he was feeling pinching in the anterior shoulder. However, patient reported no pain today. Will continue to progress strengthening as tolerated. Plan: Continue per plan of care.       Precautions: N/A      Manuals    SCJ AP gr 3-4      ACJ inferior gr3-4      R GHJ A-P/Inf [place]      CTJ      Thoracic P-A      Lumbar roll grV      SOR with rotation to the right      Multifidus isometric            Prone CTJ       Central [place] C7 with median/radial nerve glides       B pec and UT STM      Cervical and periscapular STM      Cervical side glides       Cupping B rhomboids / UT / LS KA    Static 3'     AROM cervical rot 10x, SB 10x, scapular protraction/retraction 20x ea KA    Static 3'     AROM cervical rot 10x, SB 10x, scapular protraction/retraction 20x ea KA    Static 3'     AROM cervical rot 10x, SB 10x, scapular protraction/retraction 20x ea   Cupping R anterior shoulder and biceps tendon KA    Static 3'    AROM shoulder extension and L SB 15x  KA    Static 3'    AROM shoulder extension and L SB 15x  KA    Static 3'    AROM shoulder extension and L SB 15x    Lumbar paraspinals STM      Lumbar P-A      PT Re-Evaluation      Cupping pec and anterior shoulder B       Neuro Re-Ed      Standing Shoulder Scaption      Prone Scapular Retractions      Supine pec Stretch on Foam Roll      SA punches      BOSU push ups plus      SA ball rolls        Wall push ups plus      Standing Rows       Prone Ts      Thoracic Extension      TRX Rows      LPD      I Y T 2# 3x10 YT 3# 3x10 YTI 3# 3x10 YTI   Incline Chest press      KB rot      [place] carry      Serratus pushups      Prone Is      Wall slides with serratus press      Plank wobble board      Body blade      Cervical SNAG      Chin retractions supine      Robots       Cat cow      rebounder       Dead bug       UT TD      My little pony      PBall press rhythmic stab       ER isometric 90 degrees   ER and IR 9090 iso 5"x15 ea   Plank rhythmic stab       PBall on wall 1 HHA      Reverse BOSU plank shoulder tap      OH press with dowel carry      TrX plank push up      SL ER  5# 3x10 5# 3x10 5# 3x10   Ther Ex      pulley      UBE arm bike for postural endurance training 8' retro standing  8' retro standing 8' retro standing   Side plank       Walking plank circuit      Plank tap      full bankrobbers       1/2 kneel with OH press       Standing B shoulder horizontal abd/add      Bench TB       Stir the pot      PBall rollout      TrX plank      TRX rows      MB 90/90 toss against wall       BOSU ball toss      TB snow angels       RTC lift straight up  GTB 3x10 GTB 3x10 GTB 3x12   Plank row       scap punches       Blaze pods plank tap       Ther Activity      Pt Education      Bear Crawl       Standing ER ball taps      Prone shoulder ER catch      Kneeling ER follow through catch      Gait Training                  Modalities

## 2023-09-13 ENCOUNTER — OFFICE VISIT (OUTPATIENT)
Dept: PHYSICAL THERAPY | Facility: OTHER | Age: 23
End: 2023-09-13
Payer: COMMERCIAL

## 2023-09-13 DIAGNOSIS — M25.811 IMPINGEMENT OF RIGHT SHOULDER: Primary | ICD-10-CM

## 2023-09-13 DIAGNOSIS — R29.3 POSTURE IMBALANCE: ICD-10-CM

## 2023-09-13 PROCEDURE — 97140 MANUAL THERAPY 1/> REGIONS: CPT

## 2023-09-13 PROCEDURE — 97112 NEUROMUSCULAR REEDUCATION: CPT

## 2023-09-13 PROCEDURE — 97110 THERAPEUTIC EXERCISES: CPT

## 2023-09-13 NOTE — PROGRESS NOTES
Daily Note     Today's date: 2023  Patient name: Lucretia Mccain  : 2000  MRN: [id number]  Referring provider: Juvenal Frost*  Dx:   Encounter Diagnosis     ICD-10-CM    1. Impingement of right shoulder  M25.811       2. Posture imbalance  R29.3                      Subjective: "I haven't had pain since PT, I didn't go to the gym."      Objective: See treatment diary below      Assessment: Same session as previous with adjusted number of reps based on patient having no symptoms in a few days. Tolerated all exercises well without symptoms except for bringing arm down from wall during iso. Will trial lateral TD raises in order to practice this motion. Next visit, reintegrate rhythmic stab with ball on wall. Continue to progress slowly in order to avoid symptoms and continue strengthening as tolerated. Plan: Continue per plan of care.       Precautions: N/A      Manuals    SCJ AP gr 3-4       ACJ inferior gr3-4       R GHJ A-P/Inf [place]       CTJ       Thoracic P-A       Lumbar roll grV       SOR with rotation to the right       Multifidus isometric              Prone CTJ        Central [place] C7 with median/radial nerve glides        B pec and UT STM       Cervical and periscapular STM       Cervical side glides        Cupping B rhomboids / UT / LS KA    Static 3'     AROM cervical rot 10x, SB 10x, scapular protraction/retraction 20x ea KA    Static 3'     AROM cervical rot 10x, SB 10x, scapular protraction/retraction 20x ea KA    Static 3'     AROM cervical rot 10x, SB 10x, scapular protraction/retraction 20x ea KA    Static 3'     AROM cervical rot 10x, SB 10x, scapular protraction/retraction 20x ea   Cupping R anterior shoulder and biceps tendon KA    Static 3'    AROM shoulder extension and L SB 15x  KA    Static 3'    AROM shoulder extension and L SB 15x  KA    Static 3'    AROM shoulder extension and L SB 15x  KA    Static 3'    AROM shoulder extension and L SB 15x    Lumbar paraspinals STM       Lumbar P-A       PT Re-Evaluation       Cupping pec and anterior shoulder B        Neuro Re-Ed       Standing Shoulder Scaption       Prone Scapular Retractions       Supine pec Stretch on Foam Roll       SA punches       BOSU push ups plus       SA ball rolls         Wall push ups plus       Standing Rows        Prone Ts       Thoracic Extension       TRX Rows       LPD       I Y T 2# 3x10 YT 3# 3x10 YTI 3# 3x10 YTI 4# 3x10 YTI   Incline Chest press       KB rot       [place] carry       Serratus pushups       Prone Is       Wall slides with serratus press       Plank wobble board       Body blade       Cervical SNAG       Chin retractions supine       Robots        Cat cow       rebounder        Dead bug        UT TD       My little pony       PBall press rhythmic stab        ER isometric 90 degrees   ER and IR 9090 iso 5"x15 ea ER and IR 9090 iso 5"x20 ea   Plank rhythmic stab        PBall on wall 1 HHA       Reverse BOSU plank shoulder tap       OH press with dowel carry       TrX plank push up       SL ER  5# 3x10 5# 3x10 5# 3x10 5# 3x12   Ther Ex       pulley       UBE arm bike for postural endurance training 8' retro standing  8' retro standing 8' retro standing 8' retro standing   Side plank        Walking plank circuit       Plank tap       full bankrobbers        1/2 kneel with OH press        Standing B shoulder horizontal abd/add       Bench TB        Stir the pot       PBall rollout       TrX plank       TRX rows       MB 90/90 toss against wall        BOSU ball toss       TB snow angels        RTC lift straight up  GTB 3x10 GTB 3x10 GTB 3x12 BTB 3x10   Plank row        scap punches        Blaze pods plank tap        Ther Activity       Pt Education       Bear Crawl        Standing ER ball taps       Prone shoulder ER catch       Kneeling ER follow through catch       Gait Training                     Modalities

## 2023-09-19 ENCOUNTER — APPOINTMENT (OUTPATIENT)
Dept: PHYSICAL THERAPY | Facility: OTHER | Age: 23
End: 2023-09-19
Payer: COMMERCIAL

## 2023-09-21 ENCOUNTER — OFFICE VISIT (OUTPATIENT)
Dept: PHYSICAL THERAPY | Facility: OTHER | Age: 23
End: 2023-09-21
Payer: COMMERCIAL

## 2023-09-21 DIAGNOSIS — R29.3 POSTURE IMBALANCE: ICD-10-CM

## 2023-09-21 DIAGNOSIS — M25.811 IMPINGEMENT OF RIGHT SHOULDER: Primary | ICD-10-CM

## 2023-09-21 PROCEDURE — 97110 THERAPEUTIC EXERCISES: CPT

## 2023-09-21 PROCEDURE — 97112 NEUROMUSCULAR REEDUCATION: CPT

## 2023-09-21 PROCEDURE — 97140 MANUAL THERAPY 1/> REGIONS: CPT

## 2023-09-21 NOTE — PROGRESS NOTES
Daily Note     Today's date: 2023  Patient name: Britney Gil  : 2000  MRN: [id number]  Referring provider: Georgi Álvarez*  Dx:   Encounter Diagnosis     ICD-10-CM    1. Impingement of right shoulder  M25.811       2. Posture imbalance  R29.3                    Total treatment time 6105-3330, 1-on-1 [id number]  Subjective: "My shoulder feels pretty good. I almost threw a baseball but I decided not to because I didn't want to hurt my shoulder."      Objective: See treatment diary below      Assessment: Session focused on same exercises as last visit as patient experienced slight soreness post treatment. Tolerated all exercises well with minimal to no symptoms. Will add in ball on wall and TD TB nv in order to strengthen shoulder in 90-90 position. Plan: Continue per plan of care.       Precautions: N/A      Manuals    SCJ AP gr 3-4        ACJ inferior gr3-4        R GHJ A-P/Inf [place]        CTJ        Thoracic P-A        Lumbar roll grV        SOR with rotation to the right        Multifidus isometric                Prone CTJ         Central [place] C7 with median/radial nerve glides         B pec and UT STM        Cervical and periscapular STM        Cervical side glides         Cupping B rhomboids / UT / LS KA    Static 3'     AROM cervical rot 10x, SB 10x, scapular protraction/retraction 20x ea KA    Static 3'     AROM cervical rot 10x, SB 10x, scapular protraction/retraction 20x ea KA    Static 3'     AROM cervical rot 10x, SB 10x, scapular protraction/retraction 20x ea KA    Static 3'     AROM cervical rot 10x, SB 10x, scapular protraction/retraction 20x ea KA    Static 3'     AROM cervical rot 10x, SB 10x, scapular protraction/retraction 20x ea   Cupping R anterior shoulder and biceps tendon KA    Static 3'    AROM shoulder extension and L SB 15x  KA    Static 3'    AROM shoulder extension and L SB 15x  KA    Static 3'    AROM shoulder extension and L SB 15x KA    Static 3'    AROM shoulder extension and L SB 15x  KA    Static 3'    AROM shoulder extension and L SB 15x    Lumbar paraspinals STM        Lumbar P-A        PT Re-Evaluation        Cupping pec and anterior shoulder B         Neuro Re-Ed        Standing Shoulder Scaption        Prone Scapular Retractions        Supine pec Stretch on Foam Roll        SA punches        BOSU push ups plus        SA ball rolls          Wall push ups plus        Standing Rows         Prone Ts        Thoracic Extension        TRX Rows        LPD        I Y T 2# 3x10 YT 3# 3x10 YTI 3# 3x10 YTI 4# 3x10 YTI 4# 3x10 YTI   Incline Chest press        KB rot        [place] carry        Serratus pushups        Prone Is        Wall slides with serratus press        Plank wobble board        Body blade        Cervical SNAG        Chin retractions supine        Robots         Cat cow        rebounder         Dead bug         UT TD        My little pony        PBall press rhythmic stab         ER isometric 90 degrees   ER and IR 9090 iso 5"x15 ea ER and IR 9090 iso 5"x20 ea ER and IR 9090 iso 5"x20 ea   Plank rhythmic stab         PBall on wall 1 HHA        Reverse BOSU plank shoulder tap        OH press with dowel carry        TrX plank push up        SL ER  5# 3x10 5# 3x10 5# 3x10 5# 3x12 5# 3x12   Ther Ex        pulley        UBE arm bike for postural endurance training 8' retro standing  8' retro standing 8' retro standing 8' retro standing 8' retro standing   Side plank         Walking plank circuit        Plank tap        full bankrobbers         1/2 kneel with OH press         Standing B shoulder horizontal abd/add        Bench TB         Stir the pot        PBall rollout        TrX plank        TRX rows        MB 90/90 toss against wall         BOSU ball toss        TB snow angels         RTC lift straight up  GTB 3x10 GTB 3x10 GTB 3x12 BTB 3x10 BTB 3x10   Plank row         scap punches         Blaze pods plank tap         Ther Activity        Pt Education        Bear Crawl         Standing ER ball taps        Prone shoulder ER catch        Kneeling ER follow through catch        Gait Training                        Modalities

## 2023-09-25 ENCOUNTER — APPOINTMENT (OUTPATIENT)
Dept: PHYSICAL THERAPY | Facility: OTHER | Age: 23
End: 2023-09-25
Payer: COMMERCIAL

## 2023-09-25 NOTE — PROGRESS NOTES
Daily Note     Today's date: 2023  Patient name: Sean Al  : 2000  MRN: [id number]  Referring provider: Brielle Pagan*  Dx: No diagnosis found. Subjective: ***      Objective: See treatment diary below      Assessment: Tolerated treatment {Tolerated treatment :[id number]}.  Patient {assessment:[id number]}      Plan: {PLAN:[id number]}     Precautions: N/A    POC expires Unit limit Auth Expiration date PT/OT + Visit Limit?   10/4/23 BOMN n/a BOMN                           Visit/Unit Tracking  AUTH Status:  Date               n/a Used                Remaining                        Manuals    SCJ AP gr 3-4        ACJ inferior gr3-4        R GHJ A-P/Inf [place]        CTJ        Thoracic P-A        Lumbar roll grV        SOR with rotation to the right        Multifidus isometric                Prone CTJ         Central [place] C7 with median/radial nerve glides         B pec and UT STM        Cervical and periscapular STM        Cervical side glides         Cupping B rhomboids / UT / LS KA    Static 3'     AROM cervical rot 10x, SB 10x, scapular protraction/retraction 20x ea KA    Static 3'     AROM cervical rot 10x, SB 10x, scapular protraction/retraction 20x ea KA    Static 3'     AROM cervical rot 10x, SB 10x, scapular protraction/retraction 20x ea KA    Static 3'     AROM cervical rot 10x, SB 10x, scapular protraction/retraction 20x ea KA    Static 3'     AROM cervical rot 10x, SB 10x, scapular protraction/retraction 20x ea   Cupping R anterior shoulder and biceps tendon KA    Static 3'    AROM shoulder extension and L SB 15x  KA    Static 3'    AROM shoulder extension and L SB 15x  KA    Static 3'    AROM shoulder extension and L SB 15x  KA    Static 3'    AROM shoulder extension and L SB 15x  KA    Static 3'    AROM shoulder extension and L SB 15x    Lumbar paraspinals STM        Lumbar P-A        PT Re-Evaluation        Cupping pec and anterior shoulder B         Neuro Re-Ed        Standing Shoulder Scaption        Prone Scapular Retractions        Supine pec Stretch on Foam Roll        SA punches        BOSU push ups plus        SA ball rolls          Wall push ups plus        Standing Rows         Prone Ts        Thoracic Extension        TRX Rows        LPD        I Y T 2# 3x10 YT 3# 3x10 YTI 3# 3x10 YTI 4# 3x10 YTI 4# 3x10 YTI   Incline Chest press        KB rot        Copan carry        Serratus pushups        Prone Is        Wall slides with serratus press        Plank wobble board        Body blade        Cervical SNAG        Chin retractions supine        Robots         Cat cow        rebounder         Dead bug         UT TD        My little pony        PBall press rhythmic stab         ER isometric 90 degrees   ER and IR 9090 iso 5"x15 ea ER and IR 9090 iso 5"x20 ea ER and IR 9090 iso 5"x20 ea   Plank rhythmic stab         PBall on wall 1 HHA        Reverse BOSU plank shoulder tap        OH press with dowel carry        TrX plank push up        SL ER  5# 3x10 5# 3x10 5# 3x10 5# 3x12 5# 3x12   Ther Ex        pulley        UBE arm bike for postural endurance training 8' retro standing  8' retro standing 8' retro standing 8' retro standing 8' retro standing   Side plank         Walking plank circuit        Plank tap        full bankrobbers         1/2 kneel with OH press         Standing B shoulder horizontal abd/add        Bench TB         Stir the pot        PBall rollout        TrX plank        TRX rows        MB 90/90 toss against wall         BOSU ball toss        TB snow angels         RTC lift straight up  GTB 3x10 GTB 3x10 GTB 3x12 BTB 3x10 BTB 3x10   Plank row         scap punches         Blaze pods plank tap         Ther Activity        Pt Education        Bear Crawl         Standing ER ball taps        Prone shoulder ER catch        Kneeling ER follow through catch        Gait Training                        Modalities

## 2023-09-27 ENCOUNTER — APPOINTMENT (OUTPATIENT)
Dept: PHYSICAL THERAPY | Facility: OTHER | Age: 23
End: 2023-09-27
Payer: COMMERCIAL